# Patient Record
Sex: FEMALE | Race: WHITE | NOT HISPANIC OR LATINO | Employment: UNEMPLOYED | URBAN - METROPOLITAN AREA
[De-identification: names, ages, dates, MRNs, and addresses within clinical notes are randomized per-mention and may not be internally consistent; named-entity substitution may affect disease eponyms.]

---

## 2019-04-09 ENCOUNTER — HOSPITAL ENCOUNTER (EMERGENCY)
Facility: HOSPITAL | Age: 24
Discharge: HOME | End: 2019-04-09
Attending: EMERGENCY MEDICINE | Admitting: EMERGENCY MEDICINE

## 2019-04-09 ENCOUNTER — APPOINTMENT (OUTPATIENT)
Dept: MRI IMAGING | Facility: HOSPITAL | Age: 24
End: 2019-04-09

## 2019-04-09 VITALS
BODY MASS INDEX: 28.32 KG/M2 | RESPIRATION RATE: 16 BRPM | SYSTOLIC BLOOD PRESSURE: 134 MMHG | WEIGHT: 170 LBS | OXYGEN SATURATION: 97 % | HEART RATE: 87 BPM | HEIGHT: 65 IN | TEMPERATURE: 98 F | DIASTOLIC BLOOD PRESSURE: 70 MMHG

## 2019-04-09 DIAGNOSIS — R20.2 PARESTHESIA OF UPPER AND LOWER EXTREMITIES OF BOTH SIDES: Primary | ICD-10-CM

## 2019-04-09 DIAGNOSIS — D17.9 LIPOMA, UNSPECIFIED SITE: ICD-10-CM

## 2019-04-09 LAB
ALBUMIN SERPL-MCNC: 4.2 G/DL (ref 3.5–5.2)
ALBUMIN/GLOB SERPL: 1.2 G/DL
ALP SERPL-CCNC: 67 U/L (ref 39–117)
ALT SERPL W P-5'-P-CCNC: 11 U/L (ref 1–33)
ANION GAP SERPL CALCULATED.3IONS-SCNC: 13 MMOL/L
AST SERPL-CCNC: 32 U/L (ref 1–32)
B-HCG UR QL: NEGATIVE
BASOPHILS # BLD AUTO: 0.03 10*3/MM3 (ref 0–0.2)
BASOPHILS NFR BLD AUTO: 0.5 % (ref 0–1.5)
BILIRUB SERPL-MCNC: 0.4 MG/DL (ref 0.2–1.2)
BUN BLD-MCNC: 9 MG/DL (ref 6–20)
BUN/CREAT SERPL: 12.3 (ref 7–25)
CALCIUM SPEC-SCNC: 8.7 MG/DL (ref 8.6–10.5)
CHLORIDE SERPL-SCNC: 103 MMOL/L (ref 98–107)
CO2 SERPL-SCNC: 21 MMOL/L (ref 22–29)
CREAT BLD-MCNC: 0.73 MG/DL (ref 0.57–1)
DEPRECATED RDW RBC AUTO: 41.7 FL (ref 37–54)
EOSINOPHIL # BLD AUTO: 0.1 10*3/MM3 (ref 0–0.4)
EOSINOPHIL NFR BLD AUTO: 1.7 % (ref 0.3–6.2)
ERYTHROCYTE [DISTWIDTH] IN BLOOD BY AUTOMATED COUNT: 14.8 % (ref 12.3–15.4)
GFR SERPL CREATININE-BSD FRML MDRD: 98 ML/MIN/1.73
GLOBULIN UR ELPH-MCNC: 3.4 GM/DL
GLUCOSE BLD-MCNC: 100 MG/DL (ref 65–99)
HCT VFR BLD AUTO: 35.1 % (ref 34–46.6)
HGB BLD-MCNC: 10.4 G/DL (ref 12–15.9)
IMM GRANULOCYTES # BLD AUTO: 0.01 10*3/MM3 (ref 0–0.05)
IMM GRANULOCYTES NFR BLD AUTO: 0.2 % (ref 0–0.5)
INTERNAL NEGATIVE CONTROL: NEGATIVE
INTERNAL POSITIVE CONTROL: POSITIVE
LYMPHOCYTES # BLD AUTO: 1.8 10*3/MM3 (ref 0.7–3.1)
LYMPHOCYTES NFR BLD AUTO: 30.6 % (ref 19.6–45.3)
Lab: NORMAL
MCH RBC QN AUTO: 23.3 PG (ref 26.6–33)
MCHC RBC AUTO-ENTMCNC: 29.6 G/DL (ref 31.5–35.7)
MCV RBC AUTO: 78.5 FL (ref 79–97)
MONOCYTES # BLD AUTO: 0.62 10*3/MM3 (ref 0.1–0.9)
MONOCYTES NFR BLD AUTO: 10.5 % (ref 5–12)
NEUTROPHILS # BLD AUTO: 3.33 10*3/MM3 (ref 1.4–7)
NEUTROPHILS NFR BLD AUTO: 56.7 % (ref 42.7–76)
PLATELET # BLD AUTO: 310 10*3/MM3 (ref 140–450)
PMV BLD AUTO: 9 FL (ref 6–12)
POTASSIUM BLD-SCNC: 4.7 MMOL/L (ref 3.5–5.2)
PROT SERPL-MCNC: 7.6 G/DL (ref 6–8.5)
RBC # BLD AUTO: 4.47 10*6/MM3 (ref 3.77–5.28)
SODIUM BLD-SCNC: 137 MMOL/L (ref 136–145)
WBC NRBC COR # BLD: 5.88 10*3/MM3 (ref 3.4–10.8)

## 2019-04-09 PROCEDURE — A9577 INJ MULTIHANCE: HCPCS | Performed by: EMERGENCY MEDICINE

## 2019-04-09 PROCEDURE — 99284 EMERGENCY DEPT VISIT MOD MDM: CPT

## 2019-04-09 PROCEDURE — 85025 COMPLETE CBC W/AUTO DIFF WBC: CPT | Performed by: PHYSICIAN ASSISTANT

## 2019-04-09 PROCEDURE — 80053 COMPREHEN METABOLIC PANEL: CPT | Performed by: PHYSICIAN ASSISTANT

## 2019-04-09 PROCEDURE — 81025 URINE PREGNANCY TEST: CPT | Performed by: PHYSICIAN ASSISTANT

## 2019-04-09 PROCEDURE — 70553 MRI BRAIN STEM W/O & W/DYE: CPT

## 2019-04-09 PROCEDURE — 0 GADOBENATE DIMEGLUMINE 529 MG/ML SOLUTION: Performed by: EMERGENCY MEDICINE

## 2019-04-09 RX ADMIN — GADOBENATE DIMEGLUMINE 15 ML: 529 INJECTION, SOLUTION INTRAVENOUS at 16:33

## 2019-04-09 NOTE — ED PROVIDER NOTES
Subjective   Ms. Liliya Renteria is a 23-year-old female presenting to the emergency department with complaints of a neurologic problem. The patient reports that she was diagnosed with a pituitary tumor about one year ago in a hospital in [place]. She reports that she had multiple transient ischemic attacks, diagnosed by her OB/GYN at the time, as she was pregnant with her third child. She has continued to have TIAs since that time, and the frequency has increased significantly. Her most recent episode was four days ago. She states that the episodes are characterized by first a tingling sensation in her hand (usually the right side) into her arm, face, and sometimes to her leg. Often symptoms of tingling start on one side of her body and then move from right to left. If she does not sit down in time, she falls to the ground. She also experiencing difficulty saying things she wants to when these episodes occur. She denies accompanying dizziness, weakness, numbness, visual changes, chest pain, shortness of breath, fevers, or abdominal pain. The episodes typically last about 20-25 minutes each. She states that she has a history of smoking but quit a few weeks ago and now smokes vapors instead. Her past medical history is otherwise unremarkable. Ms. Renteria notes that she moved here to live with her sister, and she does not currently have a local neurologist or primary care provider, prompting her to instead come to the emergency department. There are no other additional acute complaints at this time.        History provided by:  Patient  Neurologic Problem   The patient's primary symptoms include clumsiness, a loss of balance, slurred speech and weakness. The patient's pertinent negatives include no focal weakness or visual change. This is a chronic problem. The current episode started more than 1 year ago. The neurological problem developed gradually. The last time the patient was known to be well was 4/9/2019 3:27 PM.   The problem has been waxing and waning since onset. Pertinent negatives include no abdominal pain, chest pain, fever or shortness of breath. Past treatments include nothing. (Pituitary tumor)       Review of Systems   Constitutional: Negative for fever.   HENT: Negative.    Eyes: Negative for visual disturbance.   Respiratory: Negative.  Negative for shortness of breath.    Cardiovascular: Negative.  Negative for chest pain.   Gastrointestinal: Negative.  Negative for abdominal pain.   Genitourinary: Negative.    Musculoskeletal: Negative.    Skin: Negative.    Neurological: Positive for speech difficulty, weakness and loss of balance. Negative for focal weakness and numbness.   Psychiatric/Behavioral: Negative.    All other systems reviewed and are negative.      History reviewed. No pertinent past medical history.    No Known Allergies    History reviewed. No pertinent surgical history.    History reviewed. No pertinent family history.    Social History     Socioeconomic History   • Marital status:      Spouse name: Not on file   • Number of children: Not on file   • Years of education: Not on file   • Highest education level: Not on file   Tobacco Use   • Smoking status: Never Smoker   • Smokeless tobacco: Never Used   Substance and Sexual Activity   • Alcohol use: No     Frequency: Never   • Drug use: No   • Sexual activity: Defer         Objective   Physical Exam   Constitutional: She is oriented to person, place, and time. She appears well-developed and well-nourished. No distress.   HENT:   Head: Normocephalic and atraumatic.   Nose: Nose normal.   Eyes: Conjunctivae are normal. No scleral icterus.   Neck: Normal range of motion. Neck supple.   Cardiovascular: Normal rate, regular rhythm and normal heart sounds.   No murmur heard.  Pulmonary/Chest: Effort normal and breath sounds normal. No respiratory distress.   Abdominal: Soft. Bowel sounds are normal. There is no tenderness.   Musculoskeletal:  "Normal range of motion.   Neurological: She is alert and oriented to person, place, and time. She has normal strength. No cranial nerve deficit or sensory deficit.   Skin: Skin is warm and dry.   Psychiatric: She has a normal mood and affect. Her behavior is normal.   Nursing note and vitals reviewed.      Procedures         ED Course      Re-examined patient several times in ED. Pt symptom free. Discussed MRI results and f/u with PCP, Neurology and Neurosurgery. She is agreeable with plan.     Reviewed old records. Obtained MRI from [facility].   MRI Brain WO contrast 3/14/18  \"subcentimeter T1 hyperintense lesion in the hypothalamic region. This may reflex small lipoma. Other etiologies could include ectopic posterior pituitary gland, calcification, or possibly subacute hemorrhage.\"    MRI Brain With Contrast 5/31/18  \"the subcentimeter T1 hyperintense lesion in the hypothalamus follows fat on all sequences and does not enhance, consistent with small lipoma.\"     Recent Results (from the past 24 hour(s))   POCT Pregnancy, Urine    Collection Time: 04/09/19  2:46 PM   Result Value Ref Range    HCG, Urine, QL Negative Negative    Lot Number [id number]     Internal Positive Control Positive     Internal Negative Control Negative    Comprehensive Metabolic Panel    Collection Time: 04/09/19  2:59 PM   Result Value Ref Range    Glucose 100 (H) 65 - 99 mg/dL    BUN 9 6 - 20 mg/dL    Creatinine 0.73 0.57 - 1.00 mg/dL    Sodium 137 136 - 145 mmol/L    Potassium 4.7 3.5 - 5.2 mmol/L    Chloride 103 98 - 107 mmol/L    CO2 21.0 (L) 22.0 - 29.0 mmol/L    Calcium 8.7 8.6 - 10.5 mg/dL    Total Protein 7.6 6.0 - 8.5 g/dL    Albumin 4.20 3.50 - 5.20 g/dL    ALT (SGPT) 11 1 - 33 U/L    AST (SGOT) 32 1 - 32 U/L    Alkaline Phosphatase 67 39 - 117 U/L    Total Bilirubin 0.4 0.2 - 1.2 mg/dL    eGFR Non African Amer 98 >60 mL/min/1.73    Globulin 3.4 gm/dL    A/G Ratio 1.2 g/dL    BUN/Creatinine Ratio 12.3 7.0 - 25.0    " "Anion Gap 13.0 mmol/L   CBC Auto Differential    Collection Time: 04/09/19  2:59 PM   Result Value Ref Range    WBC 5.88 3.40 - 10.80 10*3/mm3    RBC 4.47 3.77 - 5.28 10*6/mm3    Hemoglobin 10.4 (L) 12.0 - 15.9 g/dL    Hematocrit 35.1 34.0 - 46.6 %    MCV 78.5 (L) 79.0 - 97.0 fL    MCH 23.3 (L) 26.6 - 33.0 pg    MCHC 29.6 (L) 31.5 - 35.7 g/dL    RDW 14.8 12.3 - 15.4 %    RDW-SD 41.7 37.0 - 54.0 fl    MPV 9.0 6.0 - 12.0 fL    Platelets 310 140 - 450 10*3/mm3    Neutrophil % 56.7 42.7 - 76.0 %    Lymphocyte % 30.6 19.6 - 45.3 %    Monocyte % 10.5 5.0 - 12.0 %    Eosinophil % 1.7 0.3 - 6.2 %    Basophil % 0.5 0.0 - 1.5 %    Immature Grans % 0.2 0.0 - 0.5 %    Neutrophils, Absolute 3.33 1.40 - 7.00 10*3/mm3    Lymphocytes, Absolute 1.80 0.70 - 3.10 10*3/mm3    Monocytes, Absolute 0.62 0.10 - 0.90 10*3/mm3    Eosinophils, Absolute 0.10 0.00 - 0.40 10*3/mm3    Basophils, Absolute 0.03 0.00 - 0.20 10*3/mm3    Immature Grans, Absolute 0.01 0.00 - 0.05 10*3/mm3     Note: In addition to lab results from this visit, the labs listed above may include labs taken at another facility or during a different encounter within the last 24 hours. Please correlate lab times with ED admission and discharge times for further clarification of the services performed during this visit.    MRI Brain With & Without Contrast    (Results Pending)     Vitals:    04/09/19 1250 04/09/19 1939   BP: 126/61 134/70   BP Location: Left arm    Patient Position: Sitting    Pulse: 96 87   Resp: 18 16   Temp: 97.9 °F (36.6 °C) 98 °F (36.7 °C)   TempSrc: Oral    SpO2: 96% 97%   Weight: 77.1 kg (170 lb)    Height: 165.1 cm (65\")      Medications   gadobenate dimeglumine (MULTIHANCE) injection 15 mL (15 mL Intravenous Given 4/9/19 1633)     ECG/EMG Results (last 24 hours)     ** No results found for the last 24 hours. **        No orders to display                       MDM    Final diagnoses:   Paresthesia of upper and lower extremities of both sides   Lipoma, " unspecified site       Documentation assistance provided by jozef Almonte.  Information recorded by the jozef was done at my direction and has been verified and validated by me.     Gregorio Almonte  04/09/19 1528       Courtney Davenport PA  04/09/19 3752

## 2019-04-09 NOTE — DISCHARGE INSTRUCTIONS
Follow up with one of the [facility] Primary Care Providers below to setup primary care. If you need assistance coordinating a primary care appointment with a [facility] Primary Care Provider, please contact the Primary Care Coordinators at [phone] for appointment scheduling.    [facility], Primary Care   [address], [address]   [place], [place] [address] [phone]    [facility] End[facility]  [address], [address]  [place] [address] [phone]    [facility]  [address], [address]   [place], [place] [address] [phone]    [facility]  [address], [address]  [place], [place] [address] [phone]    [facility], Primary Care,   [address], [address]   [place], [place] [address] [phone]    [facility]  [address], [address]  [place], [place] [id number] [phone]    [facility], Primary Care  [address], [address]  [place], [place] [address] [phone]    [facility]  [address], [address]   [place] [address] [phone]    [facility], Primary Care  [address] [place] [address] [phone]      [facility]  [address], [address]   [place] [address] [phone]    [facility]  [address] [place] [address] [phone]    Follow up with one of the physician centers below to setup primary care.    [facility], [phone], [address], [address], [place], [facility] [place]    [facility], [phone], [address]  [address]    [facility], [phone], [address] [place] [address];     [facility], [phone], [address] [place] [address]

## 2019-05-20 ENCOUNTER — OFFICE VISIT (OUTPATIENT)
Dept: INTERNAL MEDICINE | Facility: CLINIC | Age: 24
End: 2019-05-20

## 2019-05-20 VITALS
SYSTOLIC BLOOD PRESSURE: 100 MMHG | OXYGEN SATURATION: 98 % | DIASTOLIC BLOOD PRESSURE: 58 MMHG | BODY MASS INDEX: 31.74 KG/M2 | RESPIRATION RATE: 18 BRPM | HEIGHT: 65 IN | HEART RATE: 76 BPM | TEMPERATURE: 99.2 F | WEIGHT: 190.5 LBS

## 2019-05-20 DIAGNOSIS — R20.0 NUMBNESS AND TINGLING: ICD-10-CM

## 2019-05-20 DIAGNOSIS — Z13.29 SCREENING FOR THYROID DISORDER: ICD-10-CM

## 2019-05-20 DIAGNOSIS — L72.9 CUTANEOUS CYST: ICD-10-CM

## 2019-05-20 DIAGNOSIS — Z13.21 ENCOUNTER FOR VITAMIN DEFICIENCY SCREENING: ICD-10-CM

## 2019-05-20 DIAGNOSIS — D64.9 ANEMIA, UNSPECIFIED TYPE: ICD-10-CM

## 2019-05-20 DIAGNOSIS — R53.83 FATIGUE, UNSPECIFIED TYPE: ICD-10-CM

## 2019-05-20 DIAGNOSIS — R20.2 NUMBNESS AND TINGLING: ICD-10-CM

## 2019-05-20 DIAGNOSIS — F41.9 ANXIETY: Primary | ICD-10-CM

## 2019-05-20 DIAGNOSIS — Z13.0 SCREENING FOR BLOOD DISEASE: ICD-10-CM

## 2019-05-20 DIAGNOSIS — G93.0 SUPRASELLAR CYST: ICD-10-CM

## 2019-05-20 DIAGNOSIS — R20.0 FACIAL NUMBNESS: ICD-10-CM

## 2019-05-20 LAB
ALBUMIN SERPL-MCNC: 4.2 G/DL (ref 3.5–5.2)
ALBUMIN/GLOB SERPL: 1.2 G/DL
ALP SERPL-CCNC: 61 U/L (ref 39–117)
ALT SERPL W P-5'-P-CCNC: 14 U/L (ref 1–33)
ANION GAP SERPL CALCULATED.3IONS-SCNC: 13.6 MMOL/L
AST SERPL-CCNC: 17 U/L (ref 1–32)
BASOPHILS # BLD AUTO: 0.03 10*3/MM3 (ref 0–0.2)
BASOPHILS NFR BLD AUTO: 0.5 % (ref 0–1.5)
BILIRUB SERPL-MCNC: 0.4 MG/DL (ref 0.2–1.2)
BUN BLD-MCNC: 7 MG/DL (ref 6–20)
BUN/CREAT SERPL: 11.1 (ref 7–25)
CALCIUM SPEC-SCNC: 9.7 MG/DL (ref 8.6–10.5)
CHLORIDE SERPL-SCNC: 103 MMOL/L (ref 98–107)
CO2 SERPL-SCNC: 23.4 MMOL/L (ref 22–29)
CREAT BLD-MCNC: 0.63 MG/DL (ref 0.57–1)
DEPRECATED RDW RBC AUTO: 46.2 FL (ref 37–54)
EOSINOPHIL # BLD AUTO: 0.04 10*3/MM3 (ref 0–0.4)
EOSINOPHIL NFR BLD AUTO: 0.7 % (ref 0.3–6.2)
ERYTHROCYTE [DISTWIDTH] IN BLOOD BY AUTOMATED COUNT: 15.9 % (ref 12.3–15.4)
FOLATE SERPL-MCNC: 3.52 NG/ML (ref 4.78–24.2)
GFR SERPL CREATININE-BSD FRML MDRD: 116 ML/MIN/1.73
GLOBULIN UR ELPH-MCNC: 3.6 GM/DL
GLUCOSE BLD-MCNC: 81 MG/DL (ref 65–99)
HCT VFR BLD AUTO: 37.4 % (ref 34–46.6)
HGB BLD-MCNC: 10.8 G/DL (ref 12–15.9)
IMM GRANULOCYTES # BLD AUTO: 0.02 10*3/MM3 (ref 0–0.05)
IMM GRANULOCYTES NFR BLD AUTO: 0.3 % (ref 0–0.5)
LYMPHOCYTES # BLD AUTO: 1.48 10*3/MM3 (ref 0.7–3.1)
LYMPHOCYTES NFR BLD AUTO: 24.6 % (ref 19.6–45.3)
MCH RBC QN AUTO: 23.3 PG (ref 26.6–33)
MCHC RBC AUTO-ENTMCNC: 28.9 G/DL (ref 31.5–35.7)
MCV RBC AUTO: 80.6 FL (ref 79–97)
MONOCYTES # BLD AUTO: 0.58 10*3/MM3 (ref 0.1–0.9)
MONOCYTES NFR BLD AUTO: 9.6 % (ref 5–12)
NEUTROPHILS # BLD AUTO: 3.87 10*3/MM3 (ref 1.7–7)
NEUTROPHILS NFR BLD AUTO: 64.3 % (ref 42.7–76)
NRBC BLD AUTO-RTO: 0 /100 WBC (ref 0–0.2)
PLATELET # BLD AUTO: 345 10*3/MM3 (ref 140–450)
PMV BLD AUTO: 9.8 FL (ref 6–12)
POTASSIUM BLD-SCNC: 4.4 MMOL/L (ref 3.5–5.2)
PROT SERPL-MCNC: 7.8 G/DL (ref 6–8.5)
RBC # BLD AUTO: 4.64 10*6/MM3 (ref 3.77–5.28)
SODIUM BLD-SCNC: 140 MMOL/L (ref 136–145)
TSH SERPL DL<=0.05 MIU/L-ACNC: 1.16 MIU/ML (ref 0.27–4.2)
VIT B12 BLD-MCNC: 335 PG/ML (ref 211–946)
WBC NRBC COR # BLD: 6.02 10*3/MM3 (ref 3.4–10.8)

## 2019-05-20 PROCEDURE — 84466 ASSAY OF TRANSFERRIN: CPT | Performed by: NURSE PRACTITIONER

## 2019-05-20 PROCEDURE — 82652 VIT D 1 25-DIHYDROXY: CPT | Performed by: NURSE PRACTITIONER

## 2019-05-20 PROCEDURE — 83540 ASSAY OF IRON: CPT | Performed by: NURSE PRACTITIONER

## 2019-05-20 PROCEDURE — 82607 VITAMIN B-12: CPT | Performed by: NURSE PRACTITIONER

## 2019-05-20 PROCEDURE — 80050 GENERAL HEALTH PANEL: CPT | Performed by: NURSE PRACTITIONER

## 2019-05-20 PROCEDURE — 82746 ASSAY OF FOLIC ACID SERUM: CPT | Performed by: NURSE PRACTITIONER

## 2019-05-20 RX ORDER — HYDROXYZINE HYDROCHLORIDE 25 MG/1
TABLET, FILM COATED ORAL
Qty: 90 TABLET | Refills: 1 | Status: SHIPPED | OUTPATIENT
Start: 2019-05-20 | End: 2020-10-08
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
## 2019-05-21 NOTE — PROGRESS NOTES
I have reviewed the notes, assessments, and/or procedures performed by KANE Cid and I concur with her documentation of Liliya Renteria.

## 2019-05-22 DIAGNOSIS — R90.89 ABNORMAL FINDING ON MRI OF BRAIN: Primary | ICD-10-CM

## 2019-05-22 DIAGNOSIS — R20.2 NUMBNESS AND TINGLING: ICD-10-CM

## 2019-05-22 DIAGNOSIS — E53.9 VITAMIN B DEFICIENCY: ICD-10-CM

## 2019-05-22 DIAGNOSIS — D64.9 ANEMIA, UNSPECIFIED TYPE: ICD-10-CM

## 2019-05-22 DIAGNOSIS — R20.0 NUMBNESS AND TINGLING: ICD-10-CM

## 2019-05-22 DIAGNOSIS — E53.8 FOLIC ACID DEFICIENCY: Primary | ICD-10-CM

## 2019-05-22 LAB
IRON 24H UR-MRATE: 33 MCG/DL (ref 37–145)
IRON SATN MFR SERPL: 7 % (ref 20–50)
TIBC SERPL-MCNC: 504 MCG/DL (ref 298–536)
TRANSFERRIN SERPL-MCNC: 338 MG/DL (ref 200–360)

## 2019-05-22 RX ORDER — LANOLIN ALCOHOL/MO/W.PET/CERES
400 CREAM (GRAM) TOPICAL DAILY
Qty: 30 TABLET | Refills: 11 | Status: SHIPPED | OUTPATIENT
Start: 2019-05-22 | End: 2019-10-02

## 2019-05-22 RX ORDER — MULTIVIT/IRON SULF/FOLIC ACID 15MG-0.4MG
1 TABLET ORAL DAILY
Qty: 30 TABLET | Refills: 11 | Status: SHIPPED | OUTPATIENT
Start: 2019-05-22 | End: 2019-10-02

## 2019-05-23 LAB — 1,25(OH)2D3 SERPL-MCNC: 51.5 PG/ML (ref 19.9–79.3)

## 2019-10-02 ENCOUNTER — OFFICE VISIT (OUTPATIENT)
Dept: INTERNAL MEDICINE | Facility: CLINIC | Age: 24
End: 2019-10-02

## 2019-10-02 VITALS
SYSTOLIC BLOOD PRESSURE: 110 MMHG | TEMPERATURE: 96.6 F | DIASTOLIC BLOOD PRESSURE: 62 MMHG | HEART RATE: 84 BPM | RESPIRATION RATE: 18 BRPM | HEIGHT: 65 IN | BODY MASS INDEX: 31.51 KG/M2 | WEIGHT: 189.13 LBS | OXYGEN SATURATION: 100 %

## 2019-10-02 DIAGNOSIS — F41.9 ANXIETY: ICD-10-CM

## 2019-10-02 DIAGNOSIS — F33.1 MODERATE EPISODE OF RECURRENT MAJOR DEPRESSIVE DISORDER (HCC): Primary | ICD-10-CM

## 2019-10-02 DIAGNOSIS — M25.551 RIGHT HIP PAIN: ICD-10-CM

## 2019-10-02 PROCEDURE — 99214 OFFICE O/P EST MOD 30 MIN: CPT | Performed by: NURSE PRACTITIONER

## 2019-10-02 PROCEDURE — 96372 THER/PROPH/DIAG INJ SC/IM: CPT | Performed by: NURSE PRACTITIONER

## 2019-10-02 RX ORDER — NAPROXEN 500 MG/1
500 TABLET ORAL 2 TIMES DAILY WITH MEALS
Qty: 60 TABLET | Refills: 5 | Status: SHIPPED | OUTPATIENT
Start: 2019-10-02 | End: 2019-11-06

## 2019-10-02 RX ORDER — KETOROLAC TROMETHAMINE 30 MG/ML
60 INJECTION, SOLUTION INTRAMUSCULAR; INTRAVENOUS ONCE
Status: COMPLETED | OUTPATIENT
Start: 2019-10-02 | End: 2019-10-02

## 2019-10-02 RX ORDER — PREDNISONE 1 MG/1
TABLET ORAL
Qty: 21 TABLET | Refills: 0 | Status: SHIPPED | OUTPATIENT
Start: 2019-10-02 | End: 2019-11-06

## 2019-10-02 RX ORDER — BUSPIRONE HYDROCHLORIDE 10 MG/1
10 TABLET ORAL 3 TIMES DAILY PRN
Qty: 60 TABLET | Refills: 1 | Status: SHIPPED | OUTPATIENT
Start: 2019-10-02 | End: 2020-10-08

## 2019-10-02 RX ORDER — TRIAMCINOLONE ACETONIDE 40 MG/ML
40 INJECTION, SUSPENSION INTRA-ARTICULAR; INTRAMUSCULAR ONCE
Status: COMPLETED | OUTPATIENT
Start: 2019-10-02 | End: 2019-10-02

## 2019-10-02 RX ADMIN — TRIAMCINOLONE ACETONIDE 40 MG: 40 INJECTION, SUSPENSION INTRA-ARTICULAR; INTRAMUSCULAR at 11:44

## 2019-10-02 RX ADMIN — KETOROLAC TROMETHAMINE 60 MG: 30 INJECTION, SOLUTION INTRAMUSCULAR; INTRAVENOUS at 11:44

## 2019-10-02 NOTE — PROGRESS NOTES
Subjective   Chief Complaint   Patient presents with   • Hip Pain     x2 days   • Depression      Liliya Renteria is a 24 y.o. female here today for right hip pain and depression.    I have reviewed the following portions of the patient's history and confirmed they are accurate: allergies, current medications, past family history, past medical history, past social history, past surgical history, problem list and ROS     I have personally completed the patient's review of systems.    Review of Systems   Constitutional: Positive for fatigue. Negative for activity change, appetite change, chills, diaphoresis, fever, unexpected weight gain and unexpected weight loss.   HENT: Negative for ear discharge, ear pain, mouth sores, nosebleeds, sinus pressure, sneezing and sore throat.    Eyes: Negative for pain, discharge and itching.   Respiratory: Negative for cough, chest tightness, shortness of breath and wheezing.    Cardiovascular: Negative for chest pain, palpitations and leg swelling.   Gastrointestinal: Negative for abdominal pain, constipation, diarrhea, nausea and vomiting.   Endocrine: Negative for heat intolerance, polydipsia and polyphagia.   Genitourinary: Negative for dysuria, flank pain, frequency, hematuria and urgency.   Musculoskeletal: Positive for arthralgias. Negative for back pain, gait problem, joint swelling, myalgias, neck pain and neck stiffness.   Skin: Negative for color change, pallor and rash.   Allergic/Immunologic: Negative for immunocompromised state.   Neurological: Positive for weakness and numbness. Negative for seizures and speech difficulty.   Hematological: Negative for adenopathy.   Psychiatric/Behavioral: Positive for dysphoric mood and depressed mood. Negative for agitation, decreased concentration and sleep disturbance. The patient is nervous/anxious.        Current Outpatient Medications on File Prior to Visit   Medication Sig   • hydrOXYzine (ATARAX) 25 MG tablet Take 1-2 tablets up  "to 3 times daily as needed for anxiety.     No current facility-administered medications on file prior to visit.        Objective   Vitals:    10/02/19 1104   BP: 110/62   BP Location: Left arm   Patient Position: Sitting   Cuff Size: Adult   Pulse: 84   Resp: 18   Temp: 96.6 °F (35.9 °C)   TempSrc: Temporal   SpO2: 100%   Weight: 85.8 kg (189 lb 2 oz)   Height: 165.1 cm (65\")   PainSc:   8   PainLoc: Hip     Body mass index is 31.47 kg/m².    Physical Exam   Constitutional: She is oriented to person, place, and time. She appears well-developed and well-nourished.   HENT:   Head: Normocephalic and atraumatic.   Nose: Nose normal.   Eyes: EOM are normal. Pupils are equal, round, and reactive to light.   Neck: Trachea normal and normal range of motion. No thyromegaly present.   Cardiovascular: Normal rate, regular rhythm and normal heart sounds.   Pulmonary/Chest: Effort normal and breath sounds normal.   Abdominal: Soft. Bowel sounds are normal. There is no tenderness.   Musculoskeletal: Normal range of motion.        Right hip: She exhibits tenderness.   Neurological: She is alert and oriented to person, place, and time. She has normal strength. No cranial nerve deficit or sensory deficit. GCS eye subscore is 4. GCS verbal subscore is 5. GCS motor subscore is 6.   Skin: Skin is warm and dry.   Psychiatric: Her speech is normal and behavior is normal. Thought content normal. Her mood appears anxious. Cognition and memory are normal. She exhibits a depressed mood.   Vitals reviewed.      Assessment/Plan   Liliya was seen today for hip pain and depression.    Diagnoses and all orders for this visit:    Moderate episode of recurrent major depressive disorder (CMS/HCC)  Chronic issue unstable requiring medication management and monitoring. Will eat well balanced diet, increase water intake, increase physical activity during the day, and get adequate rest. Discussed relaxation and coping skills and exercises. Suggested " self referral to behavioral therapist.  Start Zoloft.  -     sertraline (ZOLOFT) 50 MG tablet; Take 1 tablet by mouth Daily.    Anxiety  Chronic issue unstable requiring medication management and monitoring. Will eat well balanced diet, increase water intake, increase physical activity during the day, and get adequate rest. Discussed relaxation and coping skills and exercises. Suggested self referral to behavioral therapist.  Start Zoloft and BuSpar.  Continue hydroxyzine as needed.  -     sertraline (ZOLOFT) 50 MG tablet; Take 1 tablet by mouth Daily.  -     busPIRone (BUSPAR) 10 MG tablet; Take 1 tablet by mouth 3 (Three) Times a Day As Needed (anxiety).    Right hip pain  New onset issue requiring further work-up and medication management.  She was given Toradol and Kenalog injections today.  She will start naproxen and steroid pack tomorrow.  She will apply ice or heating pad to her hip as needed for discomfort.  -     ketorolac (TORADOL) injection 60 mg  -     triamcinolone acetonide (KENALOG-40) injection 40 mg  -     predniSONE (DELTASONE) 5 MG tablet; Taper pack, take as directed.  -     naproxen (EC NAPROSYN) 500 MG EC tablet; Take 1 tablet by mouth 2 (Two) Times a Day With Meals.  -     XR Hip With or Without Pelvis 2 - 3 View Right; Future           Current Outpatient Medications:   •  hydrOXYzine (ATARAX) 25 MG tablet, Take 1-2 tablets up to 3 times daily as needed for anxiety., Disp: 90 tablet, Rfl: 1  •  busPIRone (BUSPAR) 10 MG tablet, Take 1 tablet by mouth 3 (Three) Times a Day As Needed (anxiety)., Disp: 60 tablet, Rfl: 1  •  naproxen (EC NAPROSYN) 500 MG EC tablet, Take 1 tablet by mouth 2 (Two) Times a Day With Meals., Disp: 60 tablet, Rfl: 5  •  predniSONE (DELTASONE) 5 MG tablet, Taper pack, take as directed., Disp: 21 tablet, Rfl: 0  •  sertraline (ZOLOFT) 50 MG tablet, Take 1 tablet by mouth Daily., Disp: 30 tablet, Rfl: 1       Plan of care reviewed with the patient at the conclusion of  today's visit.  Education was provided regarding diagnosis, management, and any prescribed or recommended OTC medications.  Patient verbalized understanding of and agreement with management plan.     Return in about 1 month (around 11/2/2019), or if symptoms worsen or fail to improve.      Karlie Alexandre, APRN

## 2019-11-01 ENCOUNTER — TELEPHONE (OUTPATIENT)
Dept: INTERNAL MEDICINE | Facility: CLINIC | Age: 24
End: 2019-11-01

## 2019-11-01 DIAGNOSIS — F41.9 ANXIETY: ICD-10-CM

## 2019-11-01 DIAGNOSIS — F33.1 MODERATE EPISODE OF RECURRENT MAJOR DEPRESSIVE DISORDER (HCC): ICD-10-CM

## 2019-12-23 ENCOUNTER — TELEPHONE (OUTPATIENT)
Dept: INTERNAL MEDICINE | Facility: CLINIC | Age: 24
End: 2019-12-23

## 2019-12-23 DIAGNOSIS — F41.9 ANXIETY: ICD-10-CM

## 2019-12-23 DIAGNOSIS — F33.1 MODERATE EPISODE OF RECURRENT MAJOR DEPRESSIVE DISORDER (HCC): ICD-10-CM

## 2020-10-08 ENCOUNTER — OFFICE VISIT (OUTPATIENT)
Dept: INTERNAL MEDICINE | Facility: CLINIC | Age: 25
End: 2020-10-08

## 2020-10-08 VITALS
BODY MASS INDEX: 34.66 KG/M2 | HEART RATE: 70 BPM | HEIGHT: 65 IN | OXYGEN SATURATION: 98 % | RESPIRATION RATE: 16 BRPM | WEIGHT: 208 LBS | DIASTOLIC BLOOD PRESSURE: 84 MMHG | TEMPERATURE: 97.2 F | SYSTOLIC BLOOD PRESSURE: 132 MMHG

## 2020-10-08 DIAGNOSIS — F33.1 MODERATE EPISODE OF RECURRENT MAJOR DEPRESSIVE DISORDER (HCC): Primary | ICD-10-CM

## 2020-10-08 DIAGNOSIS — E66.9 CLASS 1 OBESITY WITH SERIOUS COMORBIDITY AND BODY MASS INDEX (BMI) OF 34.0 TO 34.9 IN ADULT, UNSPECIFIED OBESITY TYPE: ICD-10-CM

## 2020-10-08 DIAGNOSIS — F41.9 ANXIETY: ICD-10-CM

## 2020-10-08 PROCEDURE — 99214 OFFICE O/P EST MOD 30 MIN: CPT | Performed by: NURSE PRACTITIONER

## 2020-10-08 RX ORDER — BUPROPION HYDROCHLORIDE 150 MG/1
150 TABLET ORAL DAILY
Qty: 30 TABLET | Refills: 1 | Status: SHIPPED | OUTPATIENT
Start: 2020-10-08 | End: 2020-11-06

## 2020-10-08 NOTE — PROGRESS NOTES
"Subjective   Chief Complaint   Patient presents with   • Anxiety     \"I need something, I don't know if it's bipolar, anger or what\"   • Depression     \"I quit taking the zoloft because it was causing weight gain\"      Liliya Renteria is a 25 y.o. female here today for depression, anxiety, and weight gain.  Patient was previously prescribed Zoloft for anxiety and depression.  This helped her symptoms but she has had weight gain.  She stopped the medication but has not been successful in losing the weight.  She is having episodes of sadness and difficulty with motivation and getting up in the morning.  She has 3 children that she is trying to homeschool and this is been very stressful.  She is having some anxiety but mostly agitation and irritability.  She states she feels angry and aggravated most of the time.  She reports eating a healthy diet but has not been physically active.  No thoughts of self-harm or harming others.  No shortness of breath or chest pain.    I have reviewed the following portions of the patient's history and confirmed they are accurate: allergies, current medications, past family history, past medical history, past social history, past surgical history and problem list    I have personally completed the patient's review of systems.    Review of Systems   Constitutional: Positive for fatigue and unexpected weight gain. Negative for activity change, appetite change, chills, diaphoresis, fever and unexpected weight loss.   HENT: Negative for ear discharge, ear pain, mouth sores, nosebleeds, sinus pressure, sneezing and sore throat.    Eyes: Negative for pain, discharge and itching.   Respiratory: Negative for cough, chest tightness, shortness of breath and wheezing.    Cardiovascular: Negative for chest pain, palpitations and leg swelling.   Gastrointestinal: Negative for abdominal pain, constipation, diarrhea, nausea and vomiting.   Endocrine: Negative for heat intolerance, polydipsia and " "polyphagia.   Genitourinary: Negative for dysuria, flank pain, frequency, hematuria and urgency.   Musculoskeletal: Positive for arthralgias. Negative for back pain, gait problem, joint swelling, myalgias, neck pain and neck stiffness.   Skin: Negative for color change, pallor and rash.   Allergic/Immunologic: Negative for immunocompromised state.   Neurological: Negative for seizures, speech difficulty, weakness and numbness.   Hematological: Negative for adenopathy.   Psychiatric/Behavioral: Positive for agitation, decreased concentration, dysphoric mood, depressed mood and stress. Negative for sleep disturbance. The patient is nervous/anxious.        Current Outpatient Medications on File Prior to Visit   Medication Sig   • [DISCONTINUED] busPIRone (BUSPAR) 10 MG tablet Take 1 tablet by mouth 3 (Three) Times a Day As Needed (anxiety).   • [DISCONTINUED] hydrOXYzine (ATARAX) 25 MG tablet Take 1-2 tablets up to 3 times daily as needed for anxiety.   • [DISCONTINUED] sertraline (ZOLOFT) 50 MG tablet Take 1 tablet by mouth Daily.     No current facility-administered medications on file prior to visit.        Objective   Vitals:    10/08/20 0900   BP: 132/84   Pulse: 70   Resp: 16   Temp: 97.2 °F (36.2 °C)   TempSrc: Temporal   SpO2: 98%   Weight: 94.3 kg (208 lb)   Height: 165.1 cm (65\")   PainSc: 0-No pain     Body mass index is 34.61 kg/m².    Physical Exam  Vitals signs reviewed.   Constitutional:       Appearance: Normal appearance. She is well-developed.   HENT:      Head: Normocephalic and atraumatic.      Nose: Nose normal.   Eyes:      General: Lids are normal.      Conjunctiva/sclera: Conjunctivae normal.      Pupils: Pupils are equal, round, and reactive to light.   Neck:      Thyroid: No thyromegaly.      Trachea: Trachea normal.   Pulmonary:      Effort: Pulmonary effort is normal. No respiratory distress.   Skin:     General: Skin is warm and dry.   Neurological:      Mental Status: She is alert and " oriented to person, place, and time.      GCS: GCS eye subscore is 4. GCS verbal subscore is 5. GCS motor subscore is 6.   Psychiatric:         Attention and Perception: Attention normal.         Mood and Affect: Mood is anxious and depressed.         Speech: Speech normal.         Behavior: Behavior normal. Behavior is cooperative.         Assessment/Plan   Problem List Items Addressed This Visit        Digestive    Class 1 obesity with serious comorbidity and body mass index (BMI) of 34.0 to 34.9 in adult    Overview     Chronic issue unstable requiring monitoring with lifestyle and diet changes.  Discussed weight loss and BMI plan with patient. Discussed and educated on Mediterranean diet.  Will follow a heart healthy diet low in cholesterol and fat.  Will focus on eating less refined carbohydrates and sugars and replace this with complex carbs.  Will eat smaller portion sizes and increase physical activity as tolerated.  Will drink adequate water and get adequate sleep. Will start multi-vit.   Start wellbutrin.          Relevant Medications    buPROPion XL (Wellbutrin XL) 150 MG 24 hr tablet       Other    Anxiety    Overview     Chronic issue unstable requiring medication management and monitoring. Will eat well balanced diet, increase water intake, increase physical activity during the day, and get adequate rest. Discussed relaxation and coping skills and exercises.   Start wellbutrin.            Relevant Medications    buPROPion XL (Wellbutrin XL) 150 MG 24 hr tablet    Moderate episode of recurrent major depressive disorder (CMS/HCC) - Primary    Overview     Chronic issue unstable requiring medication management and monitoring. Will eat well balanced diet, increase water intake, increase physical activity during the day, and get adequate rest. Discussed relaxation and coping skills and exercises.   Start wellbutrin. [employer] testing completed.          Relevant Medications    buPROPion XL (Wellbutrin XL)  150 MG 24 hr tablet             Current Outpatient Medications:   •  buPROPion XL (Wellbutrin XL) 150 MG 24 hr tablet, Take 1 tablet by mouth Daily., Disp: 30 tablet, Rfl: 1       Plan of care reviewed with the patient at the conclusion of today's visit.  Education was provided regarding diagnosis, management, and any prescribed or recommended OTC medications.  Patient verbalized understanding of and agreement with management plan.     Return in about 1 month (around 11/8/2020), or if symptoms worsen or fail to improve.      Karlie Alexandre, KANE    Please note that portions of this note were completed with a voice recognition program. Efforts were made to edit the dictations, but occasionally words are mistranscribed.

## 2020-11-06 ENCOUNTER — OFFICE VISIT (OUTPATIENT)
Dept: INTERNAL MEDICINE | Facility: CLINIC | Age: 25
End: 2020-11-06

## 2020-11-06 VITALS
TEMPERATURE: 96.8 F | BODY MASS INDEX: 34.66 KG/M2 | HEIGHT: 65 IN | OXYGEN SATURATION: 98 % | SYSTOLIC BLOOD PRESSURE: 124 MMHG | WEIGHT: 208 LBS | RESPIRATION RATE: 16 BRPM | DIASTOLIC BLOOD PRESSURE: 78 MMHG | HEART RATE: 97 BPM

## 2020-11-06 DIAGNOSIS — F33.1 MODERATE EPISODE OF RECURRENT MAJOR DEPRESSIVE DISORDER (HCC): Primary | ICD-10-CM

## 2020-11-06 DIAGNOSIS — F41.9 ANXIETY: ICD-10-CM

## 2020-11-06 DIAGNOSIS — E66.9 CLASS 1 OBESITY WITH SERIOUS COMORBIDITY AND BODY MASS INDEX (BMI) OF 34.0 TO 34.9 IN ADULT, UNSPECIFIED OBESITY TYPE: ICD-10-CM

## 2020-11-06 PROCEDURE — 99214 OFFICE O/P EST MOD 30 MIN: CPT | Performed by: NURSE PRACTITIONER

## 2020-11-06 RX ORDER — BUPROPION HYDROCHLORIDE 300 MG/1
300 TABLET ORAL DAILY
Qty: 30 TABLET | Refills: 2 | Status: SHIPPED | OUTPATIENT
Start: 2020-11-06 | End: 2021-01-19 | Stop reason: SDUPTHER

## 2020-11-06 NOTE — PROGRESS NOTES
Subjective   Chief Complaint   Patient presents with   • Depression     f/u      Liliya Renteria is a 25 y.o. female here today for obesity, depression, and anxiety.  Anxiety and depression initially improved when starting Wellbutrin but the past week she feels that symptoms have worsened.  She was progressively gaining weight and since starting Wellbutrin she has had no weight gain.  She is having difficulty losing weight but has not gained.  She likes Wellbutrin and denies any side effects.  She would like to increase the dose.  No thoughts of self-harm or harming others.  No shortness of breath or chest pain.    I have reviewed the following portions of the patient's history and confirmed they are accurate: allergies, current medications, past family history, past medical history, past social history, past surgical history and problem list    I have personally completed the patient's review of systems.    Review of Systems   Constitutional: Positive for fatigue. Negative for activity change, appetite change, chills, diaphoresis, fever, unexpected weight gain and unexpected weight loss.   HENT: Negative for ear discharge, ear pain, mouth sores, nosebleeds, sinus pressure, sneezing and sore throat.    Eyes: Negative for pain, discharge and itching.   Respiratory: Negative for cough, chest tightness, shortness of breath and wheezing.    Cardiovascular: Negative for chest pain, palpitations and leg swelling.   Gastrointestinal: Negative for abdominal pain, constipation, diarrhea, nausea and vomiting.   Endocrine: Negative for heat intolerance, polydipsia and polyphagia.   Genitourinary: Negative for dysuria, flank pain, frequency, hematuria and urgency.   Musculoskeletal: Positive for arthralgias. Negative for back pain, gait problem, joint swelling, myalgias, neck pain and neck stiffness.   Skin: Negative for color change, pallor and rash.   Allergic/Immunologic: Negative for immunocompromised state.   Neurological:  "Negative for seizures, speech difficulty, weakness and numbness.   Hematological: Negative for adenopathy.   Psychiatric/Behavioral: Positive for depressed mood and stress. Negative for agitation, decreased concentration, dysphoric mood and sleep disturbance. The patient is nervous/anxious.        No current outpatient medications on file prior to visit.     No current facility-administered medications on file prior to visit.        Objective   Vitals:    11/06/20 0949   BP: 124/78   Pulse: 97   Resp: 16   Temp: 96.8 °F (36 °C)   TempSrc: Temporal   SpO2: 98%   Weight: 94.3 kg (208 lb)   Height: 165.1 cm (65\")   PainSc: 0-No pain     Body mass index is 34.61 kg/m².    Physical Exam  Vitals signs reviewed.   Constitutional:       Appearance: Normal appearance. She is well-developed.   HENT:      Head: Normocephalic and atraumatic.      Nose: Nose normal.   Eyes:      General: Lids are normal.      Conjunctiva/sclera: Conjunctivae normal.      Pupils: Pupils are equal, round, and reactive to light.   Neck:      Thyroid: No thyromegaly.      Trachea: Trachea normal.   Pulmonary:      Effort: Pulmonary effort is normal. No respiratory distress.   Skin:     General: Skin is warm and dry.   Neurological:      Mental Status: She is alert and oriented to person, place, and time.      GCS: GCS eye subscore is 4. GCS verbal subscore is 5. GCS motor subscore is 6.   Psychiatric:         Attention and Perception: Attention normal.         Mood and Affect: Mood is anxious and depressed.         Speech: Speech normal.         Behavior: Behavior normal. Behavior is cooperative.         Assessment/Plan   Problem List Items Addressed This Visit        Digestive    Class 1 obesity with serious comorbidity and body mass index (BMI) of 34.0 to 34.9 in adult    Overview     Chronic issue unstable requiring monitoring with lifestyle and diet changes.  Discussed weight loss and BMI plan with patient. Discussed and educated on " Mediterranean diet.  Will follow a heart healthy diet low in cholesterol and fat.  Will focus on eating less refined carbohydrates and sugars and replace this with complex carbs.  Will eat smaller portion sizes and increase physical activity as tolerated.  Will drink adequate water and get adequate sleep. Will start multi-vit.   Increase wellbutrin.             Other    Anxiety    Overview     Chronic issue unstable requiring medication management and monitoring. Will eat well balanced diet, increase water intake, increase physical activity during the day, and get adequate rest. Discussed relaxation and coping skills and exercises.   Increase wellbutrin.          Moderate episode of recurrent major depressive disorder (CMS/HCC) - Primary    Overview     Chronic issue unstable requiring medication management and monitoring. Will eat well balanced diet, increase water intake, increase physical activity during the day, and get adequate rest. Discussed relaxation and coping skills and exercises.   Increase wellbutrin.          Relevant Medications    buPROPion XL (Wellbutrin XL) 300 MG 24 hr tablet             Current Outpatient Medications:   •  buPROPion XL (Wellbutrin XL) 300 MG 24 hr tablet, Take 1 tablet by mouth Daily., Disp: 30 tablet, Rfl: 2       Plan of care reviewed with the patient at the conclusion of today's visit.  Education was provided regarding diagnosis, management, and any prescribed or recommended OTC medications.  Patient verbalized understanding of and agreement with management plan.     Return in about 2 weeks (around 11/20/2020), or if symptoms worsen or fail to improve, for schedule 2 week telephone follow up. .      KANE Wu    Please note that portions of this note were completed with a voice recognition program. Efforts were made to edit the dictations, but occasionally words are mistranscribed.

## 2020-11-20 ENCOUNTER — OFFICE VISIT (OUTPATIENT)
Dept: INTERNAL MEDICINE | Facility: CLINIC | Age: 25
End: 2020-11-20

## 2020-11-20 DIAGNOSIS — E66.9 CLASS 1 OBESITY WITH SERIOUS COMORBIDITY AND BODY MASS INDEX (BMI) OF 34.0 TO 34.9 IN ADULT, UNSPECIFIED OBESITY TYPE: ICD-10-CM

## 2020-11-20 DIAGNOSIS — F41.9 ANXIETY: ICD-10-CM

## 2020-11-20 DIAGNOSIS — F33.1 MODERATE EPISODE OF RECURRENT MAJOR DEPRESSIVE DISORDER (HCC): Primary | ICD-10-CM

## 2020-11-20 PROCEDURE — 99214 OFFICE O/P EST MOD 30 MIN: CPT | Performed by: NURSE PRACTITIONER

## 2020-12-08 ENCOUNTER — TELEMEDICINE (OUTPATIENT)
Dept: INTERNAL MEDICINE | Facility: CLINIC | Age: 25
End: 2020-12-08

## 2020-12-08 DIAGNOSIS — M25.532 PAIN IN BOTH WRISTS: ICD-10-CM

## 2020-12-08 DIAGNOSIS — M25.40 JOINT SWELLING: ICD-10-CM

## 2020-12-08 DIAGNOSIS — M25.531 PAIN IN BOTH WRISTS: ICD-10-CM

## 2020-12-08 DIAGNOSIS — M79.642 PAIN IN BOTH HANDS: Primary | ICD-10-CM

## 2020-12-08 DIAGNOSIS — M79.641 PAIN IN BOTH HANDS: Primary | ICD-10-CM

## 2020-12-08 PROCEDURE — 99214 OFFICE O/P EST MOD 30 MIN: CPT | Performed by: NURSE PRACTITIONER

## 2020-12-08 RX ORDER — MELOXICAM 15 MG/1
TABLET ORAL
Qty: 30 TABLET | Refills: 5 | Status: SHIPPED | OUTPATIENT
Start: 2020-12-08 | End: 2021-06-29

## 2020-12-16 DIAGNOSIS — F41.9 ANXIETY: ICD-10-CM

## 2020-12-16 DIAGNOSIS — F33.1 MODERATE EPISODE OF RECURRENT MAJOR DEPRESSIVE DISORDER (HCC): ICD-10-CM

## 2020-12-16 DIAGNOSIS — E66.9 CLASS 1 OBESITY WITH SERIOUS COMORBIDITY AND BODY MASS INDEX (BMI) OF 34.0 TO 34.9 IN ADULT, UNSPECIFIED OBESITY TYPE: ICD-10-CM

## 2020-12-17 RX ORDER — BUPROPION HYDROCHLORIDE 150 MG/1
150 TABLET ORAL DAILY
Qty: 30 TABLET | Refills: 1 | OUTPATIENT
Start: 2020-12-17

## 2021-01-02 PROBLEM — M79.642 PAIN IN BOTH HANDS: Status: ACTIVE | Noted: 2021-01-02

## 2021-01-02 PROBLEM — M79.641 PAIN IN BOTH HANDS: Status: ACTIVE | Noted: 2021-01-02

## 2021-01-19 RX ORDER — BUPROPION HYDROCHLORIDE 300 MG/1
300 TABLET ORAL DAILY
Qty: 30 TABLET | Refills: 2 | Status: SHIPPED | OUTPATIENT
Start: 2021-01-19 | End: 2021-06-29

## 2021-02-28 DIAGNOSIS — F33.1 MODERATE EPISODE OF RECURRENT MAJOR DEPRESSIVE DISORDER (HCC): ICD-10-CM

## 2021-02-28 DIAGNOSIS — F41.9 ANXIETY: ICD-10-CM

## 2021-02-28 DIAGNOSIS — E66.9 CLASS 1 OBESITY WITH SERIOUS COMORBIDITY AND BODY MASS INDEX (BMI) OF 34.0 TO 34.9 IN ADULT, UNSPECIFIED OBESITY TYPE: ICD-10-CM

## 2021-03-01 RX ORDER — BUPROPION HYDROCHLORIDE 150 MG/1
150 TABLET ORAL DAILY
Qty: 30 TABLET | Refills: 1 | OUTPATIENT
Start: 2021-03-01

## 2021-05-21 ENCOUNTER — HOSPITAL ENCOUNTER (EMERGENCY)
Facility: HOSPITAL | Age: 26
Discharge: HOME | End: 2021-05-21
Attending: EMERGENCY MEDICINE | Admitting: EMERGENCY MEDICINE

## 2021-05-21 ENCOUNTER — APPOINTMENT (OUTPATIENT)
Dept: MRI IMAGING | Facility: HOSPITAL | Age: 26
End: 2021-05-21

## 2021-05-21 VITALS
TEMPERATURE: 98.1 F | OXYGEN SATURATION: 98 % | SYSTOLIC BLOOD PRESSURE: 105 MMHG | HEART RATE: 68 BPM | DIASTOLIC BLOOD PRESSURE: 46 MMHG | WEIGHT: 180 LBS | HEIGHT: 65 IN | BODY MASS INDEX: 29.99 KG/M2 | RESPIRATION RATE: 19 BRPM

## 2021-05-21 DIAGNOSIS — D17.0 LIPOMA OF HEAD: Primary | ICD-10-CM

## 2021-05-21 DIAGNOSIS — R20.2 PARESTHESIA OF RIGHT UPPER EXTREMITY: ICD-10-CM

## 2021-05-21 DIAGNOSIS — R20.2 TINGLING OF LEFT UPPER EXTREMITY: ICD-10-CM

## 2021-05-21 LAB
ALBUMIN SERPL-MCNC: 4 G/DL (ref 3.5–5.2)
ALBUMIN/GLOB SERPL: 1.5 G/DL
ALP SERPL-CCNC: 59 U/L (ref 39–117)
ALT SERPL W P-5'-P-CCNC: 13 U/L (ref 1–33)
ANION GAP SERPL CALCULATED.3IONS-SCNC: 8 MMOL/L (ref 5–15)
AST SERPL-CCNC: 14 U/L (ref 1–32)
BACTERIA UR QL AUTO: ABNORMAL /HPF
BASOPHILS # BLD AUTO: 0.03 10*3/MM3 (ref 0–0.2)
BASOPHILS NFR BLD AUTO: 0.6 % (ref 0–1.5)
BILIRUB SERPL-MCNC: 0.6 MG/DL (ref 0–1.2)
BILIRUB UR QL STRIP: NEGATIVE
BUN SERPL-MCNC: 8 MG/DL (ref 6–20)
BUN/CREAT SERPL: 14.3 (ref 7–25)
CALCIUM SPEC-SCNC: 9.2 MG/DL (ref 8.6–10.5)
CHLORIDE SERPL-SCNC: 104 MMOL/L (ref 98–107)
CLARITY UR: CLEAR
CO2 SERPL-SCNC: 25 MMOL/L (ref 22–29)
COLOR UR: YELLOW
CREAT SERPL-MCNC: 0.56 MG/DL (ref 0.57–1)
DEPRECATED RDW RBC AUTO: 45.5 FL (ref 37–54)
EOSINOPHIL # BLD AUTO: 0.04 10*3/MM3 (ref 0–0.4)
EOSINOPHIL NFR BLD AUTO: 0.8 % (ref 0.3–6.2)
ERYTHROCYTE [DISTWIDTH] IN BLOOD BY AUTOMATED COUNT: 16.4 % (ref 12.3–15.4)
GFR SERPL CREATININE-BSD FRML MDRD: 131 ML/MIN/1.73
GLOBULIN UR ELPH-MCNC: 2.6 GM/DL
GLUCOSE SERPL-MCNC: 82 MG/DL (ref 65–99)
GLUCOSE UR STRIP-MCNC: NEGATIVE MG/DL
HCT VFR BLD AUTO: 35.5 % (ref 34–46.6)
HGB BLD-MCNC: 10.4 G/DL (ref 12–15.9)
HGB UR QL STRIP.AUTO: ABNORMAL
HYALINE CASTS UR QL AUTO: ABNORMAL /LPF
IMM GRANULOCYTES # BLD AUTO: 0.02 10*3/MM3 (ref 0–0.05)
IMM GRANULOCYTES NFR BLD AUTO: 0.4 % (ref 0–0.5)
KETONES UR QL STRIP: NEGATIVE
LEUKOCYTE ESTERASE UR QL STRIP.AUTO: NEGATIVE
LYMPHOCYTES # BLD AUTO: 1.02 10*3/MM3 (ref 0.7–3.1)
LYMPHOCYTES NFR BLD AUTO: 19.4 % (ref 19.6–45.3)
MCH RBC QN AUTO: 23.2 PG (ref 26.6–33)
MCHC RBC AUTO-ENTMCNC: 29.3 G/DL (ref 31.5–35.7)
MCV RBC AUTO: 79.1 FL (ref 79–97)
MONOCYTES # BLD AUTO: 0.41 10*3/MM3 (ref 0.1–0.9)
MONOCYTES NFR BLD AUTO: 7.8 % (ref 5–12)
NEUTROPHILS NFR BLD AUTO: 3.73 10*3/MM3 (ref 1.7–7)
NEUTROPHILS NFR BLD AUTO: 71 % (ref 42.7–76)
NITRITE UR QL STRIP: NEGATIVE
NRBC BLD AUTO-RTO: 0 /100 WBC (ref 0–0.2)
PH UR STRIP.AUTO: 6.5 [PH] (ref 5–8)
PLATELET # BLD AUTO: 264 10*3/MM3 (ref 140–450)
PMV BLD AUTO: 9.3 FL (ref 6–12)
POTASSIUM SERPL-SCNC: 3.5 MMOL/L (ref 3.5–5.2)
PROT SERPL-MCNC: 6.6 G/DL (ref 6–8.5)
PROT UR QL STRIP: NEGATIVE
RBC # BLD AUTO: 4.49 10*6/MM3 (ref 3.77–5.28)
RBC # UR: ABNORMAL /HPF
REF LAB TEST METHOD: ABNORMAL
SODIUM SERPL-SCNC: 137 MMOL/L (ref 136–145)
SP GR UR STRIP: 1.02 (ref 1–1.03)
SQUAMOUS #/AREA URNS HPF: ABNORMAL /HPF
TROPONIN T SERPL-MCNC: <0.01 NG/ML (ref 0–0.03)
UROBILINOGEN UR QL STRIP: ABNORMAL
WBC # BLD AUTO: 5.25 10*3/MM3 (ref 3.4–10.8)
WBC UR QL AUTO: ABNORMAL /HPF

## 2021-05-21 PROCEDURE — 85025 COMPLETE CBC W/AUTO DIFF WBC: CPT | Performed by: PHYSICIAN ASSISTANT

## 2021-05-21 PROCEDURE — 0 GADOBENATE DIMEGLUMINE 529 MG/ML SOLUTION: Performed by: EMERGENCY MEDICINE

## 2021-05-21 PROCEDURE — 80053 COMPREHEN METABOLIC PANEL: CPT | Performed by: PHYSICIAN ASSISTANT

## 2021-05-21 PROCEDURE — 99283 EMERGENCY DEPT VISIT LOW MDM: CPT

## 2021-05-21 PROCEDURE — 81001 URINALYSIS AUTO W/SCOPE: CPT | Performed by: PHYSICIAN ASSISTANT

## 2021-05-21 PROCEDURE — 84484 ASSAY OF TROPONIN QUANT: CPT | Performed by: PHYSICIAN ASSISTANT

## 2021-05-21 PROCEDURE — 70553 MRI BRAIN STEM W/O & W/DYE: CPT

## 2021-05-21 PROCEDURE — A9577 INJ MULTIHANCE: HCPCS | Performed by: EMERGENCY MEDICINE

## 2021-05-21 RX ORDER — SODIUM CHLORIDE 0.9 % (FLUSH) 0.9 %
10 SYRINGE (ML) INJECTION AS NEEDED
Status: DISCONTINUED | OUTPATIENT
Start: 2021-05-21 | End: 2021-05-21 | Stop reason: HOSPADM

## 2021-05-21 RX ADMIN — GADOBENATE DIMEGLUMINE 15 ML: 529 INJECTION, SOLUTION INTRAVENOUS at 19:05

## 2021-05-22 NOTE — ED PROVIDER NOTES
Subjective   Patient is a 25-year-old female who presents emergency room today with complaints of intermittent tingling in her left arm in addition to numbness in her right ring finger and pinky.  Patient states that she has had these symptoms intermittently since 2006.  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  She reports that since this time she has continued to have these symptoms intermittently and no one has been able to determine why her symptoms occur.  She denies anything specific that makes her symptoms better or worse.  She states that her symptoms come and go and typically resolve on their own.  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  She states that every time she presents the emergency room she has to wait and that her symptoms resolve prior to being seen or imaging performed.  She denies any additional symptoms on interview and exam.          Review of Systems   Constitutional: Negative for chills, fatigue and fever.   HENT: Negative.    Eyes: Positive for visual disturbance.   Respiratory: Negative.    Cardiovascular: Negative.    Gastrointestinal: Negative.    Genitourinary: Negative.    Musculoskeletal: Negative.    Skin: Negative.    Neurological: Positive for numbness.   All other systems reviewed and are negative.      No past medical history on file.    No Known Allergies    No past surgical history on file.    No family history on file.    Social History     Socioeconomic History   • Marital status:      Spouse name: Not on file   • Number of children: Not on file   • Years of education: Not on file   • Highest  education level: Not on file           Objective   Physical Exam  Vitals and nursing note reviewed.   Constitutional:       General: She is not in acute distress.     Appearance: She is well-developed. She is not ill-appearing or toxic-appearing.   HENT:      Head: Normocephalic and atraumatic.      Nose: Nose normal.      Mouth/Throat:      Mouth: Mucous membranes are moist.   Eyes:      Extraocular Movements: Extraocular movements intact.      Conjunctiva/sclera: Conjunctivae normal.   Cardiovascular:      Rate and Rhythm: Normal rate and regular rhythm.      Pulses: Normal pulses.   Pulmonary:      Effort: Pulmonary effort is normal. No respiratory distress.      Breath sounds: Normal breath sounds.   Abdominal:      General: There is no distension.      Tenderness: There is no abdominal tenderness.   Musculoskeletal:         General: Normal range of motion.      Cervical back: Normal range of motion and neck supple. No tenderness.   Skin:     General: Skin is warm and dry.   Neurological:      General: No focal deficit present.      Mental Status: She is alert.      Motor: Motor function is intact.      Coordination: Coordination is intact.      Comments: Patient with decreased sensation in her right pinky and ring finger.    Psychiatric:         Behavior: Behavior normal.         Thought Content: Thought content normal.         Judgment: Judgment normal.         Procedures           ED Course  ED Course as of May 21 2257   Fri May 21, 2021   1958 Reviewed imaging and labs with patient.  Patient does state that she is on her menstrual period and that she has history of anemia.    [JG]   7807 Patient presents ED for evaluation of recurrent symptoms of numbness and tingling in her extremities.  No acute or emergent findings demonstrated on labs or urinalysis   MRI of the brain consistent with previous findings of 6 mm x 4 mm x 7 mm lipoma.  No additional acute or emergent findings on MRI of the brain with and  without contrast.  This has not changed since previous imaging in 2019.  Patient asymptomatic during her stay in the ER with resolution of initial symptoms.  Patient is afebrile, nontoxic appearing, vital signs stable and able to maintain O2 sats of 98% on room air. Patient will be discharged home with outpatient follow up to their primary care provider as recommended which she has called and scheduled for Monday of next week. Patient is agreeable to plan of care of outpatient follow up, provided clear return precautions and demonstrated understanding.    [JG]      ED Course User Index  [JG] Peyman Luna, PA      Recent Results (from the past 24 hour(s))   Urinalysis With Microscopic If Indicated (No Culture) - Urine, Clean Catch    Collection Time: 05/21/21  5:46 PM    Specimen: Urine, Clean Catch   Result Value Ref Range    Color, UA Yellow Yellow, Straw    Appearance, UA Clear Clear    pH, UA 6.5 5.0 - 8.0    Specific Gravity, UA 1.016 1.001 - 1.030    Glucose, UA Negative Negative    Ketones, UA Negative Negative    Bilirubin, UA Negative Negative    Blood, UA Moderate (2+) (A) Negative    Protein, UA Negative Negative    Leuk Esterase, UA Negative Negative    Nitrite, UA Negative Negative    Urobilinogen, UA 1.0 E.U./dL 0.2 - 1.0 E.U./dL   CBC Auto Differential    Collection Time: 05/21/21  5:46 PM    Specimen: Blood   Result Value Ref Range    WBC 5.25 3.40 - 10.80 10*3/mm3    RBC 4.49 3.77 - 5.28 10*6/mm3    Hemoglobin 10.4 (L) 12.0 - 15.9 g/dL    Hematocrit 35.5 34.0 - 46.6 %    MCV 79.1 79.0 - 97.0 fL    MCH 23.2 (L) 26.6 - 33.0 pg    MCHC 29.3 (L) 31.5 - 35.7 g/dL    RDW 16.4 (H) 12.3 - 15.4 %    RDW-SD 45.5 37.0 - 54.0 fl    MPV 9.3 6.0 - 12.0 fL    Platelets 264 140 - 450 10*3/mm3    Neutrophil % 71.0 42.7 - 76.0 %    Lymphocyte % 19.4 (L) 19.6 - 45.3 %    Monocyte % 7.8 5.0 - 12.0 %    Eosinophil % 0.8 0.3 - 6.2 %    Basophil % 0.6 0.0 - 1.5 %    Immature Grans % 0.4 0.0 - 0.5 %    Neutrophils,  Absolute 3.73 1.70 - 7.00 10*3/mm3    Lymphocytes, Absolute 1.02 0.70 - 3.10 10*3/mm3    Monocytes, Absolute 0.41 0.10 - 0.90 10*3/mm3    Eosinophils, Absolute 0.04 0.00 - 0.40 10*3/mm3    Basophils, Absolute 0.03 0.00 - 0.20 10*3/mm3    Immature Grans, Absolute 0.02 0.00 - 0.05 10*3/mm3    nRBC 0.0 0.0 - 0.2 /100 WBC   Urinalysis, Microscopic Only - Urine, Clean Catch    Collection Time: 05/21/21  5:46 PM    Specimen: Urine, Clean Catch   Result Value Ref Range    RBC, UA 7-12 (A) None Seen, 0-2 /HPF    WBC, UA 0-2 None Seen, 0-2 /HPF    Bacteria, UA None Seen None Seen, Trace /HPF    Squamous Epithelial Cells, UA 0-2 None Seen, 0-2 /HPF    Hyaline Casts, UA 0-6 0 - 6 /LPF    Methodology Automated Microscopy    Comprehensive Metabolic Panel    Collection Time: 05/21/21  7:20 PM    Specimen: Blood   Result Value Ref Range    Glucose 82 65 - 99 mg/dL    BUN 8 6 - 20 mg/dL    Creatinine 0.56 (L) 0.57 - 1.00 mg/dL    Sodium 137 136 - 145 mmol/L    Potassium 3.5 3.5 - 5.2 mmol/L    Chloride 104 98 - 107 mmol/L    CO2 25.0 22.0 - 29.0 mmol/L    Calcium 9.2 8.6 - 10.5 mg/dL    Total Protein 6.6 6.0 - 8.5 g/dL    Albumin 4.00 3.50 - 5.20 g/dL    ALT (SGPT) 13 1 - 33 U/L    AST (SGOT) 14 1 - 32 U/L    Alkaline Phosphatase 59 39 - 117 U/L    Total Bilirubin 0.6 0.0 - 1.2 mg/dL    eGFR Non African Amer 131 >60 mL/min/1.73    Globulin 2.6 gm/dL    A/G Ratio 1.5 g/dL    BUN/Creatinine Ratio 14.3 7.0 - 25.0    Anion Gap 8.0 5.0 - 15.0 mmol/L   Troponin    Collection Time: 05/21/21  7:20 PM    Specimen: Blood   Result Value Ref Range    Troponin T <0.010 0.000 - 0.030 ng/mL     Note: In addition to lab results from this visit, the labs listed above may include labs taken at another facility or during a different encounter within the last 24 hours. Please correlate lab times with ED admission and discharge times for further clarification of the services performed during this visit.    MRI Brain With & Without Contrast   Final  Result      1. There is a  partly empty sella. The pituitary enhances homogeneously, and there is no evidence of a focal pituitary lesion. The pituitary infundibulum is midline.   2. Well-circumscribed focus of fatty signal intensity superior and posterior to the sella just inferior to the mamillary bodies as described above, likely reflecting a lipoma.   3. Otherwise negative MRI of the brain without contrast, with no acute abnormality.      Signer Name: Karlee Gallardo MD    Signed: 5/21/2021 7:40 PM    Workstation Name: [id number]     [facility]        Vitals:    05/21/21 1649 05/21/21 1651 05/21/21 1700 05/21/21 1701   BP: 105/46      BP Location:       Patient Position:       Pulse:  70  68   Resp:       Temp:       TempSrc:       SpO2:  98% 98%    Weight:       Height:         Medications   sodium chloride 0.9 % flush 10 mL (has no administration in time range)   gadobenate dimeglumine (MULTIHANCE) injection 15 mL (15 mL Intravenous Given 5/21/21 1905)     ECG/EMG Results (last 24 hours)     ** No results found for the last 24 hours. **        No orders to display                                          MDM  Number of Diagnoses or Management Options  Lipoma of head: established and worsening  Paresthesia of right upper extremity: established and worsening  Tingling of left upper extremity: new and requires workup     Amount and/or Complexity of Data Reviewed  Clinical lab tests: reviewed  Tests in the radiology section of CPT®: reviewed    Risk of Complications, Morbidity, and/or Mortality  Presenting problems: high  Diagnostic procedures: moderate  Management options: moderate    Patient Progress  Patient progress: improved      Final diagnoses:   Lipoma of head   Paresthesia of right upper extremity   Tingling of left upper extremity       ED Disposition  ED Disposition     ED Disposition Condition Comment    Discharge Stable           Karlie Aguilar, APRN  [address]  [address]  [facility] [address]  [phone]      Follow-up as scheduled with your primary care on Monday    [facility] Emergency Department  [address] [address] [phone]  Go to   If symptoms worsen         Medication List      No changes were made to your prescriptions during this visit.          Peyman Luna, PA  05/21/21 9211

## 2021-05-24 ENCOUNTER — OFFICE VISIT (OUTPATIENT)
Dept: INTERNAL MEDICINE | Facility: CLINIC | Age: 26
End: 2021-05-24

## 2021-05-24 VITALS
RESPIRATION RATE: 18 BRPM | WEIGHT: 193 LBS | OXYGEN SATURATION: 99 % | BODY MASS INDEX: 32.15 KG/M2 | HEART RATE: 69 BPM | HEIGHT: 65 IN | SYSTOLIC BLOOD PRESSURE: 100 MMHG | TEMPERATURE: 97.1 F | DIASTOLIC BLOOD PRESSURE: 70 MMHG

## 2021-05-24 DIAGNOSIS — R20.9 SKIN SENSATION DISTURBANCE: Primary | ICD-10-CM

## 2021-05-24 DIAGNOSIS — L03.113 CELLULITIS OF RIGHT ARM: ICD-10-CM

## 2021-05-24 PROCEDURE — 84466 ASSAY OF TRANSFERRIN: CPT | Performed by: NURSE PRACTITIONER

## 2021-05-24 PROCEDURE — 82607 VITAMIN B-12: CPT | Performed by: NURSE PRACTITIONER

## 2021-05-24 PROCEDURE — 85007 BL SMEAR W/DIFF WBC COUNT: CPT | Performed by: NURSE PRACTITIONER

## 2021-05-24 PROCEDURE — 85025 COMPLETE CBC W/AUTO DIFF WBC: CPT | Performed by: NURSE PRACTITIONER

## 2021-05-24 PROCEDURE — 99214 OFFICE O/P EST MOD 30 MIN: CPT | Performed by: NURSE PRACTITIONER

## 2021-05-24 PROCEDURE — 80053 COMPREHEN METABOLIC PANEL: CPT | Performed by: NURSE PRACTITIONER

## 2021-05-24 PROCEDURE — 83540 ASSAY OF IRON: CPT | Performed by: NURSE PRACTITIONER

## 2021-05-24 RX ORDER — CEPHALEXIN 500 MG/1
500 CAPSULE ORAL 3 TIMES DAILY
Qty: 30 CAPSULE | Refills: 0 | Status: SHIPPED | OUTPATIENT
Start: 2021-05-24 | End: 2021-06-03

## 2021-05-24 NOTE — PROGRESS NOTES
"Chief Complaint   Patient presents with   • Numbness     tingling right side, 4th and 5th finger right hand    • Arm Swelling       HPI  Liliya Renteria is a 25 y.o. female presents with tingling of her right arm and 4th and 5th fingers of her right hand. Pt states this has been an ongoing issue since about 2006.  This occurs at random times and radiates up the arm into her head.  She went to the ER on Friday because the tingling started in her left hand and went up the arm and went into her face and she had difficulty seeing.  She had an MRI in the ER which was negative for a stroke.  She states that she's tired of this happening and ready for an answer as to why it keeps happening.  When she lived in [place] ago she was told she had mini strokes.    Pt also complains of right arm swelling.  It's the same arm that she had labwork done and IV in this past weekend.  The arm is red and warm as well.      The following portions of the patient's history were reviewed and updated as appropriate: allergies, current medications, past family history, past medical history, past social history, past surgical history and problem list.    Subjective  Review of Systems   Constitutional: Negative for activity change, appetite change and fatigue.   HENT: Negative for congestion.    Cardiovascular: Negative for chest pain and leg swelling.   Gastrointestinal: Negative for abdominal pain.   Musculoskeletal: Arthralgias: right arm, tingling in fingers.   Skin: Positive for color change (redness of right forearm).   Neurological: Negative for dizziness, weakness and confusion.   Psychiatric/Behavioral: Negative for behavioral problems and decreased concentration.       Objective  Visit Vitals  /70   Pulse 69   Temp 97.1 °F (36.2 °C) (Temporal)   Resp 18   Ht 165.1 cm (65\")   Wt 87.5 kg (193 lb)   SpO2 99%   BMI 32.12 kg/m²        Physical Exam  Vitals and nursing note reviewed.   HENT:      Head: Normocephalic.   Eyes:      " Pupils: Pupils are equal, round, and reactive to light.   Pulmonary:      Effort: Pulmonary effort is normal.   Musculoskeletal:      Right wrist: No swelling. Normal range of motion.      Left wrist: No swelling or tenderness.   Skin:     General: Skin is warm and dry.      Capillary Refill: Capillary refill takes less than 2 seconds.      Comments: Right posterior forearm with large induration of redness, hot to touch, bruise noted where IV was in place, no streaking   Neurological:      General: No focal deficit present.      Mental Status: She is alert and oriented to person, place, and time.   Psychiatric:         Mood and Affect: Mood normal.         Behavior: Behavior normal.         Thought Content: Thought content normal.          Procedures     Assessment and Plan  Diagnoses and all orders for this visit:    1. Skin sensation disturbance (Primary)  -     Ambulatory Referral to Neurology  -     Vitamin B12  -     CBC & Differential  -     Comprehensive Metabolic Panel  -     EMG & Nerve Conduction Test; Future    2. Cellulitis of right arm  -     cephalexin (Keflex) 500 MG capsule; Take 1 capsule by mouth 3 (Three) Times a Day for 10 days.  Dispense: 30 capsule; Refill: 0    chronic problem  Refer to neurology  Check labs  Schedule NCS with needle EMG r/o carpel tunnel  Keflex for cellulitis    Return in about 4 weeks (around 6/21/2021) for Recheck tingling in hands and discuss NCS.    KANE Troy

## 2021-05-25 DIAGNOSIS — D50.8 OTHER IRON DEFICIENCY ANEMIA: Primary | ICD-10-CM

## 2021-05-25 DIAGNOSIS — D64.9 ANEMIA, UNSPECIFIED TYPE: Primary | ICD-10-CM

## 2021-05-25 LAB
ALBUMIN SERPL-MCNC: 4.3 G/DL (ref 3.5–5.2)
ALBUMIN/GLOB SERPL: 1.7 G/DL
ALP SERPL-CCNC: 64 U/L (ref 39–117)
ALT SERPL W P-5'-P-CCNC: 13 U/L (ref 1–33)
ANION GAP SERPL CALCULATED.3IONS-SCNC: 10.9 MMOL/L (ref 5–15)
ANISOCYTOSIS BLD QL: ABNORMAL
AST SERPL-CCNC: 17 U/L (ref 1–32)
BILIRUB SERPL-MCNC: 0.4 MG/DL (ref 0–1.2)
BUN SERPL-MCNC: 9 MG/DL (ref 6–20)
BUN/CREAT SERPL: 15.5 (ref 7–25)
CALCIUM SPEC-SCNC: 8.8 MG/DL (ref 8.6–10.5)
CHLORIDE SERPL-SCNC: 103 MMOL/L (ref 98–107)
CO2 SERPL-SCNC: 24.1 MMOL/L (ref 22–29)
CREAT SERPL-MCNC: 0.58 MG/DL (ref 0.57–1)
DEPRECATED RDW RBC AUTO: 42.9 FL (ref 37–54)
EOSINOPHIL # BLD MANUAL: 0.07 10*3/MM3 (ref 0–0.4)
EOSINOPHIL NFR BLD MANUAL: 2.1 % (ref 0.3–6.2)
ERYTHROCYTE [DISTWIDTH] IN BLOOD BY AUTOMATED COUNT: 16.1 % (ref 12.3–15.4)
GFR SERPL CREATININE-BSD FRML MDRD: 126 ML/MIN/1.73
GLOBULIN UR ELPH-MCNC: 2.5 GM/DL
GLUCOSE SERPL-MCNC: 60 MG/DL (ref 65–99)
HCT VFR BLD AUTO: 30.6 % (ref 34–46.6)
HGB BLD-MCNC: 9.5 G/DL (ref 12–15.9)
IRON 24H UR-MRATE: 22 MCG/DL (ref 37–145)
IRON SATN MFR SERPL: 5 % (ref 20–50)
LYMPHOCYTES # BLD MANUAL: 0.82 10*3/MM3 (ref 0.7–3.1)
LYMPHOCYTES NFR BLD MANUAL: 25.8 % (ref 19.6–45.3)
LYMPHOCYTES NFR BLD MANUAL: 3.1 % (ref 5–12)
MCH RBC QN AUTO: 23.1 PG (ref 26.6–33)
MCHC RBC AUTO-ENTMCNC: 31 G/DL (ref 31.5–35.7)
MCV RBC AUTO: 74.3 FL (ref 79–97)
MICROCYTES BLD QL: ABNORMAL
MONOCYTES # BLD AUTO: 0.1 10*3/MM3 (ref 0.1–0.9)
NEUTROPHILS # BLD AUTO: 2.18 10*3/MM3 (ref 1.7–7)
NEUTROPHILS NFR BLD MANUAL: 69.1 % (ref 42.7–76)
PLAT MORPH BLD: NORMAL
PLATELET # BLD AUTO: 266 10*3/MM3 (ref 140–450)
PMV BLD AUTO: 9.3 FL (ref 6–12)
POIKILOCYTOSIS BLD QL SMEAR: ABNORMAL
POTASSIUM SERPL-SCNC: 4.1 MMOL/L (ref 3.5–5.2)
PROT SERPL-MCNC: 6.8 G/DL (ref 6–8.5)
RBC # BLD AUTO: 4.12 10*6/MM3 (ref 3.77–5.28)
SODIUM SERPL-SCNC: 138 MMOL/L (ref 136–145)
TIBC SERPL-MCNC: 466 MCG/DL (ref 298–536)
TRANSFERRIN SERPL-MCNC: 313 MG/DL (ref 200–360)
VIT B12 BLD-MCNC: <150 PG/ML (ref 211–946)
WBC # BLD AUTO: 3.16 10*3/MM3 (ref 3.4–10.8)
WBC MORPH BLD: NORMAL

## 2021-05-25 RX ORDER — FERROUS SULFATE 325(65) MG
325 TABLET ORAL 2 TIMES DAILY WITH MEALS
Qty: 60 TABLET | Refills: 5 | Status: SHIPPED | OUTPATIENT
Start: 2021-05-25 | End: 2021-06-29

## 2021-06-02 ENCOUNTER — CLINICAL SUPPORT (OUTPATIENT)
Dept: INTERNAL MEDICINE | Facility: CLINIC | Age: 26
End: 2021-06-02

## 2021-06-02 DIAGNOSIS — E53.8 B12 DEFICIENCY: Primary | ICD-10-CM

## 2021-06-02 PROCEDURE — 96372 THER/PROPH/DIAG INJ SC/IM: CPT | Performed by: NURSE PRACTITIONER

## 2021-06-02 RX ORDER — CYANOCOBALAMIN 1000 UG/ML
1000 INJECTION, SOLUTION INTRAMUSCULAR; SUBCUTANEOUS
Status: DISCONTINUED | OUTPATIENT
Start: 2021-06-02 | End: 2021-12-23

## 2021-06-02 RX ADMIN — CYANOCOBALAMIN 1000 MCG: 1000 INJECTION, SOLUTION INTRAMUSCULAR; SUBCUTANEOUS at 12:15

## 2021-06-08 ENCOUNTER — CLINICAL SUPPORT (OUTPATIENT)
Dept: INTERNAL MEDICINE | Facility: CLINIC | Age: 26
End: 2021-06-08

## 2021-06-08 DIAGNOSIS — E53.8 B12 DEFICIENCY: Primary | ICD-10-CM

## 2021-06-08 PROCEDURE — 96372 THER/PROPH/DIAG INJ SC/IM: CPT | Performed by: NURSE PRACTITIONER

## 2021-06-08 RX ADMIN — CYANOCOBALAMIN 1000 MCG: 1000 INJECTION, SOLUTION INTRAMUSCULAR; SUBCUTANEOUS at 13:52

## 2021-06-29 ENCOUNTER — OFFICE VISIT (OUTPATIENT)
Dept: NEUROLOGY | Facility: CLINIC | Age: 26
End: 2021-06-29

## 2021-06-29 VITALS — HEIGHT: 65 IN | HEART RATE: 82 BPM | OXYGEN SATURATION: 97 % | WEIGHT: 190 LBS | BODY MASS INDEX: 31.65 KG/M2

## 2021-06-29 DIAGNOSIS — G43.109 MIGRAINE WITH AURA AND WITHOUT STATUS MIGRAINOSUS, NOT INTRACTABLE: ICD-10-CM

## 2021-06-29 DIAGNOSIS — M54.2 NECK PAIN: Primary | ICD-10-CM

## 2021-06-29 PROCEDURE — 99204 OFFICE O/P NEW MOD 45 MIN: CPT | Performed by: PHYSICIAN ASSISTANT

## 2021-06-29 RX ORDER — SUMATRIPTAN 100 MG/1
100 TABLET, FILM COATED ORAL ONCE AS NEEDED
Qty: 9 TABLET | Refills: 5 | Status: SHIPPED | OUTPATIENT
Start: 2021-06-29 | End: 2021-12-23

## 2021-06-29 NOTE — PROGRESS NOTES
"Subjective     Chief Complaint: visual changes      History of Present Illness   Liliya Hook is a 25 y.o. female who comes to clinic today for evaluation of episodes of visual disturbance She initially noted symptoms several years ago marked by spells of changes in vision described as progressive loss of peripheral vision and  scintillating scotoma bilaterally. This typically lasts for 15-20 minutes. She notes that the spells have been more frequent over time, now typically occurring 2-3 times a month. During the spells, she states that she feels 'off'. Additionally, there may be an associated headache, right sided paresthesias, and aphasia. Additionally, she reports neck pain. Lying down often helps. There are no clear triggering or aggravating factors.    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 Screening blood work was notable for a B12 level of <150 and iron deficiency anemia, for which she is being treated through her PCP.      I have reviewed and confirmed the past family, social and medical history as accurate on 6/30/21.     Review of Systems   Constitutional: Negative.    HENT: Negative.    Eyes: Negative.    Respiratory: Negative.    Cardiovascular: Negative.    Gastrointestinal: Negative.    Endocrine: Negative.    Genitourinary: Negative.    Musculoskeletal: Negative.    Skin: Negative.    Allergic/Immunologic: Negative.    Hematological: Negative.        Objective     Pulse 82   Ht 165.1 cm (65\")   Wt 86.2 kg (190 lb)   SpO2 97%   BMI 31.62 kg/m²     General appearance today is normal.   Peripheral pulses were present and symmetric.  The ophthalmoscopic exam today is unremarkable. The discs and posterior elements are unremarkable.      Physical " Exam  Neurological:      Coordination: Finger-Nose-Finger Test and Heel to Shin Test normal.      Gait: Gait is intact.      Deep Tendon Reflexes: Strength normal.      Reflex Scores:       Patellar reflexes are 2+ on the right side and 2+ on the left side.  Psychiatric:         Speech: Speech normal.          Neurologic Exam     Mental Status   Speech: speech is normal   Level of consciousness: alert  Normal comprehension.     Cranial Nerves   Cranial nerves II through XII intact.     Motor Exam   Muscle bulk: normal  Overall muscle tone: normal    Strength   Strength 5/5 throughout.     Sensory Exam   Light touch normal.     Gait, Coordination, and Reflexes     Gait  Gait: normal    Coordination   Finger to nose coordination: normal  Heel to shin coordination: normal    Tremor   Resting tremor: absent    Reflexes   Right patellar: 2+  Left patellar: 2+          Assessment/Plan   Diagnoses and all orders for this visit:    1. Neck pain (Primary)  -     MRI Cervical Spine Without Contrast; Future    2. Migraine with aura and without status migrainosus, not intractable    Other orders  -     SUMAtriptan (IMITREX) 100 MG tablet; Take 1 tablet by mouth 1 (One) Time As Needed for Migraine. Take one tablet at onset of headache. May repeat dose one time in 2 hours if headache not relieved.  Dispense: 9 tablet; Refill: 5          Discussion/Summary   Liliya Hook comes to clinic today for evaluation of episodes of visual disturbance. Her history is concerning for complex migraines. This was discussed in detail. It was elected to obtain an MRI of the cervical spine given her associated neck pain. After discussing potential treatment options, it was elected to add sumatriptan PRN. She will then follow up in 6 months , or sooner if needed.   Total time of visit today: 45 minutes. As part of this visit I reviewed prior lab results, reviewed radiology results and reviewed radiology images. I also discussed diagnosis,  prognosis, diagnostic testing, evaluation, current status, treatment options and management as discussed above.           Ivet Toure PA-C

## 2021-07-19 ENCOUNTER — TELEPHONE (OUTPATIENT)
Dept: NEUROLOGY | Facility: CLINIC | Age: 26
End: 2021-07-19

## 2021-07-19 NOTE — TELEPHONE ENCOUNTER
Call  TEJA   Relationship: [facility]     Best call back number: [phone]      Do you know the name of the person who called: TEJA FROM [facility]     What was the call regarding: PEER TO PEER     Do you require a callback:  NO.    TEJA FROM [facility] CALLED TO LET YOU KNOW INS IS REQUESTING A PEER TO PEER . LETTER IS SCANNED AND SHE PUT NOTE  IN REFERRAL . IF YOU HAVE ANY QUESTIONS YOU CAN CALL HER AT NUMBER ABOVE .     PLEASE ADVISE .

## 2021-07-27 ENCOUNTER — HOSPITAL ENCOUNTER (OUTPATIENT)
Dept: MRI IMAGING | Facility: HOSPITAL | Age: 26
End: 2021-07-27

## 2021-07-27 NOTE — TELEPHONE ENCOUNTER
TJEA FROM  IS WANTING TO KNOW IF THE PEER TO PEER HAS BEEN DONE OR SCHEDULED. THEY DID CANCEL THE APPOINTMENT FOR TODAY.  PLEASE TEJA @ [phone]

## 2021-08-13 DIAGNOSIS — M54.2 NECK PAIN: Primary | ICD-10-CM

## 2021-11-29 ENCOUNTER — TELEPHONE (OUTPATIENT)
Dept: INTERNAL MEDICINE | Facility: CLINIC | Age: 26
End: 2021-11-29

## 2021-11-29 NOTE — TELEPHONE ENCOUNTER
Caller: Liliya Hook    Relationship to patient: Self    Best call back number: [phone]    Patient is needing: PATIENT HAS APPOINTMENT FOR FOLLOW UP FOR BLOOD CLOT IN LUNG AND TO DO SOME BLOOD WORK BUT WOULD LIKE TO SEE ABOUT TALKING TO NURSE ABOUT OPTIONS FOR PAIN     PLEASE ADVISE ASAP

## 2021-11-29 NOTE — TELEPHONE ENCOUNTER
The last time she saw me was one year ago. She needs to see me to discuss options for pain medication. I don't have any records on her having a blood clot. Can you get records from wherever she was seen. thanks

## 2021-11-29 NOTE — TELEPHONE ENCOUNTER
Pt advised. Records requested from [facility] in [place]. Pt may schedule a sooner appt with another provider.

## 2021-12-01 ENCOUNTER — TELEPHONE (OUTPATIENT)
Dept: INTERNAL MEDICINE | Facility: CLINIC | Age: 26
End: 2021-12-01

## 2021-12-01 ENCOUNTER — LAB (OUTPATIENT)
Dept: LAB | Facility: HOSPITAL | Age: 26
End: 2021-12-01

## 2021-12-01 ENCOUNTER — OFFICE VISIT (OUTPATIENT)
Dept: INTERNAL MEDICINE | Facility: CLINIC | Age: 26
End: 2021-12-01

## 2021-12-01 VITALS
OXYGEN SATURATION: 98 % | RESPIRATION RATE: 18 BRPM | HEIGHT: 65 IN | SYSTOLIC BLOOD PRESSURE: 128 MMHG | TEMPERATURE: 96.9 F | DIASTOLIC BLOOD PRESSURE: 94 MMHG | HEART RATE: 98 BPM | BODY MASS INDEX: 31.36 KG/M2 | WEIGHT: 188.2 LBS

## 2021-12-01 DIAGNOSIS — M79.609 PAIN IN EXTREMITY AT MULTIPLE SITES: Primary | ICD-10-CM

## 2021-12-01 DIAGNOSIS — M79.609 PAIN IN EXTREMITY AT MULTIPLE SITES: ICD-10-CM

## 2021-12-01 DIAGNOSIS — D69.6 THROMBOCYTOPENIA (HCC): ICD-10-CM

## 2021-12-01 DIAGNOSIS — M35.9 AUTOIMMUNE DISEASE (HCC): Primary | ICD-10-CM

## 2021-12-01 DIAGNOSIS — M25.50 GENERALIZED JOINT PAIN: Primary | ICD-10-CM

## 2021-12-01 DIAGNOSIS — M25.50 GENERALIZED JOINT PAIN: ICD-10-CM

## 2021-12-01 DIAGNOSIS — D70.9 NEUTROPENIA, UNSPECIFIED TYPE (HCC): ICD-10-CM

## 2021-12-01 LAB — NT-PROBNP SERPL-MCNC: 28.2 PG/ML (ref 0–450)

## 2021-12-01 PROCEDURE — 86038 ANTINUCLEAR ANTIBODIES: CPT

## 2021-12-01 PROCEDURE — 86431 RHEUMATOID FACTOR QUANT: CPT | Performed by: PHYSICIAN ASSISTANT

## 2021-12-01 PROCEDURE — 86140 C-REACTIVE PROTEIN: CPT

## 2021-12-01 PROCEDURE — 36415 COLL VENOUS BLD VENIPUNCTURE: CPT

## 2021-12-01 PROCEDURE — 85652 RBC SED RATE AUTOMATED: CPT

## 2021-12-01 PROCEDURE — 83880 ASSAY OF NATRIURETIC PEPTIDE: CPT | Performed by: PHYSICIAN ASSISTANT

## 2021-12-01 PROCEDURE — 85025 COMPLETE CBC W/AUTO DIFF WBC: CPT

## 2021-12-01 PROCEDURE — 86225 DNA ANTIBODY NATIVE: CPT

## 2021-12-01 PROCEDURE — 99213 OFFICE O/P EST LOW 20 MIN: CPT | Performed by: PHYSICIAN ASSISTANT

## 2021-12-01 PROCEDURE — 84550 ASSAY OF BLOOD/URIC ACID: CPT

## 2021-12-01 PROCEDURE — 80053 COMPREHEN METABOLIC PANEL: CPT | Performed by: PHYSICIAN ASSISTANT

## 2021-12-01 RX ORDER — TRAMADOL HYDROCHLORIDE 50 MG/1
50 TABLET ORAL EVERY 6 HOURS PRN
Qty: 12 TABLET | Refills: 0 | Status: SHIPPED | OUTPATIENT
Start: 2021-12-01 | End: 2021-12-06

## 2021-12-01 NOTE — TELEPHONE ENCOUNTER
Consulted on patient during her appt today. She is having some significant joint pain with generalized edema and right hand swelling. History of recent PE and taking eliquis. Tylenol not helping and unable to take NSAIDs. Will call in tramadol for her pain. Meds and DONAVAN reviewed.

## 2021-12-01 NOTE — PROGRESS NOTES
"Subjective   Chief Complaint   Patient presents with   • Hand Pain     right index finger, cant straighten    • Lung Follow-up     pulmonary embolism    • Neutropenia   • Lupus     would like test to check for lupus      Liliya Hook is a 26 y.o. female here today for ***.    I have reviewed the following portions of the patient's history and confirmed they are accurate: {History and ROS:60808::\"allergies\",\"current medications\",\"past family history\",\"past medical history\",\"past social history\",\"past surgical history\",\"problem list\"}    I have personally completed the patient's review of systems.    Review of Systems    Current Outpatient Medications on File Prior to Visit   Medication Sig   • SUMAtriptan (IMITREX) 100 MG tablet Take 1 tablet by mouth 1 (One) Time As Needed for Migraine. Take one tablet at onset of headache. May repeat dose one time in 2 hours if headache not relieved.     Current Facility-Administered Medications on File Prior to Visit   Medication   • cyanocobalamin injection 1,000 mcg       Objective   Vitals:    12/01/21 1100   BP: 128/94   Pulse: 98   Resp: 18   Temp: 96.9 °F (36.1 °C)   SpO2: 98%   Weight: 85.4 kg (188 lb 3.2 oz)   Height: 165.1 cm (65\")     Body mass index is 31.32 kg/m².    Physical Exam    Assessment/Plan   Problem List Items Addressed This Visit     None             Current Outpatient Medications:   •  SUMAtriptan (IMITREX) 100 MG tablet, Take 1 tablet by mouth 1 (One) Time As Needed for Migraine. Take one tablet at onset of headache. May repeat dose one time in 2 hours if headache not relieved., Disp: 9 tablet, Rfl: 5    Current Facility-Administered Medications:   •  cyanocobalamin injection 1,000 mcg, 1,000 mcg, Intramuscular, Q28 Days, Benji Patrick APRN, 1,000 mcg at 06/08/21 1352       Plan of care reviewed with the patient at the conclusion of today's visit.  Education was provided regarding diagnosis, management, and any prescribed or recommended OTC " medications.  Patient verbalized understanding of and agreement with management plan.     No follow-ups on file.      Karlie Alexandre, KANE

## 2021-12-01 NOTE — PROGRESS NOTES
[facility]  [address]  [place] [address]  Dept: [phone]  Dept Fax: [phone]  Loc: [phone]  Loc Fax: [phone]    Liliya Hook  1995    Follow Up Office Visit Note    History of Present Illness:  Patient is a 26-year-old female in today post pulmonary embolism 2 months ago.  Patient went to [facility].  Patient was diagnosed with blood clot in her lungs.  Unclear as to where this was as we do not have records currently.  Patient on Eliquis now.  Patient also under the care of hematology for thrombocytopenia and neutropenia.  Patient presents today with swelling and pain in multiple joints throughout her body.  Patient has not been worked up for autoimmune disease.  Patient denies a history of autoimmune diseases.  Only medication patient is taking otherwise is Imitrex for paroxysmal migraines.  Patient denies any chest pain or shortness of breath.  Patient is having some leg swelling too.      The following portions of the patient's history were reviewed and updated as appropriate: allergies, current medications, past family history, past medical history, past social history, past surgical history, and problem list.    Medications:    Current Outpatient Medications:   •  SUMAtriptan (IMITREX) 100 MG tablet, Take 1 tablet by mouth 1 (One) Time As Needed for Migraine. Take one tablet at onset of headache. May repeat dose one time in 2 hours if headache not relieved., Disp: 9 tablet, Rfl: 5    Current Facility-Administered Medications:   •  cyanocobalamin injection 1,000 mcg, 1,000 mcg, Intramuscular, Q28 Days, Benji Patrick APRN, 1,000 mcg at 06/08/21 1352    Subjective  No Known Allergies     Past Medical History:   Diagnosis Date   • Anxiety    • Blood transfusion without reported diagnosis    • Cluster headache    • Dizziness    • Fainting    • Migraine headache    • Numbness and tingling    •  "Panic attacks    • Tension headache    • Weakness        Past Surgical History:   Procedure Laterality Date   • TUBAL ABDOMINAL LIGATION         Family History   Problem Relation Age of Onset   • No Known Problems Mother    • Hypertension Father    • Diabetes Maternal Grandmother    • Arthritis Maternal Grandmother         Social History     Socioeconomic History   • Marital status:    Tobacco Use   • Smoking status: Former Smoker     Types: Cigarettes     Quit date:      Years since quittin.9   • Smokeless tobacco: Never Used   Vaping Use   • Vaping Use: Never used   Substance and Sexual Activity   • Alcohol use: No   • Drug use: No   • Sexual activity: Defer       Review of Systems   Constitutional: Negative for activity change, chills, fatigue, fever and unexpected weight change.   HENT: Negative for congestion, ear pain, postnasal drip, sinus pressure and sore throat.    Eyes: Negative for pain, discharge and redness.   Respiratory: Negative for cough, shortness of breath and wheezing.    Cardiovascular: Positive for leg swelling. Negative for chest pain and palpitations.   Gastrointestinal: Negative for diarrhea, nausea and vomiting.   Endocrine: Negative for cold intolerance and heat intolerance.   Genitourinary: Negative for decreased urine volume and dysuria.   Musculoskeletal: Positive for arthralgias, back pain, myalgias and neck pain.   Skin: Negative for rash and wound.   Neurological: Negative for dizziness, light-headedness and headaches.   Hematological: Does not bruise/bleed easily.   Psychiatric/Behavioral: Negative for confusion, dysphoric mood and sleep disturbance. The patient is not nervous/anxious.          Objective  Vitals:    21 1100   BP: 128/94   Pulse: 98   Resp: 18   Temp: 96.9 °F (36.1 °C)   SpO2: 98%   Weight: 85.4 kg (188 lb 3.2 oz)   Height: 165.1 cm (65\")     Body mass index is 31.32 kg/m².     Physical Exam  Physical Exam  Vitals and nursing note reviewed. "   Constitutional:       General: She is not in acute distress.     Appearance: She is not ill-appearing.   HENT:      Head: Normocephalic.      Right Ear: Tympanic membrane, ear canal and external ear normal. There is no impacted cerumen.      Left Ear: Tympanic membrane, ear canal and external ear normal. There is no impacted cerumen.      Nose: No congestion or rhinorrhea.      Mouth/Throat:      Mouth: Mucous membranes are moist.      Pharynx: Oropharynx is clear. No oropharyngeal exudate or posterior oropharyngeal erythema.   Eyes:      General:         Right eye: No discharge.         Left eye: No discharge.      Extraocular Movements: Extraocular movements intact.      Conjunctiva/sclera: Conjunctivae normal.      Pupils: Pupils are equal, round, and reactive to light.   Cardiovascular:      Rate and Rhythm: Normal rate and regular rhythm.      Heart sounds: Normal heart sounds. No murmur heard.  No friction rub. No gallop.    Pulmonary:      Effort: Pulmonary effort is normal. No respiratory distress.      Breath sounds: Normal breath sounds. No wheezing.   Abdominal:      General: Bowel sounds are normal. There is no distension.      Palpations: Abdomen is soft. There is no mass.      Tenderness: There is no abdominal tenderness.   Musculoskeletal:         General: Swelling (1+BLE) present. Normal range of motion.      Cervical back: Normal range of motion. No tenderness.      Right lower leg: No edema.      Left lower leg: No edema.   Lymphadenopathy:      Cervical: No cervical adenopathy.   Skin:     Findings: No bruising, erythema or rash.   Neurological:      Mental Status: She is oriented to person, place, and time.      Gait: Gait normal.   Psychiatric:         Mood and Affect: Mood normal.         Behavior: Behavior normal.         Thought Content: Thought content normal.         Judgment: Judgment normal.         Diagnostic Data  Procedures    Assessment  Diagnoses and all orders for this  visit:    1. Pain in extremity at multiple sites (Primary)  -     Rheumatoid Factor  -     CBC w AUTO Differential; Future  -     Comprehensive Metabolic Panel  -     proBNP  -     Sedimentation Rate; Future  -     C-reactive protein; Future  -     ANETA; Future  -     Uric acid; Future    2. Neutropenia, unspecified type (HCC)    3. Thrombocytopenia (HCC)        Plan      1. Pain in extremity at multiple sites (Primary)- worse w/ swelling, obtain labs per above.    2. Neutropenia, unspecified type (HCC)- cont to f/u w/ hem/onc.    3. Thrombocytopenia (HCC)- cont to f/u w/ hem/onc.      Return in about 3 weeks (around 12/22/2021) for w/ Janneth.    Peyman Cavazos PA-C  12/01/2021

## 2021-12-02 LAB
ALBUMIN SERPL-MCNC: 4.3 G/DL (ref 3.5–5.2)
ALBUMIN/GLOB SERPL: 1.3 G/DL
ALP SERPL-CCNC: 86 U/L (ref 39–117)
ALT SERPL W P-5'-P-CCNC: 19 U/L (ref 1–33)
ANA SER QL: POSITIVE
ANION GAP SERPL CALCULATED.3IONS-SCNC: 10.9 MMOL/L (ref 5–15)
AST SERPL-CCNC: 30 U/L (ref 1–32)
BASOPHILS # BLD AUTO: 0.02 10*3/MM3 (ref 0–0.2)
BASOPHILS NFR BLD AUTO: 0.6 % (ref 0–1.5)
BILIRUB SERPL-MCNC: 0.5 MG/DL (ref 0–1.2)
BUN SERPL-MCNC: 11 MG/DL (ref 6–20)
BUN/CREAT SERPL: 15.5 (ref 7–25)
CALCIUM SPEC-SCNC: 9.3 MG/DL (ref 8.6–10.5)
CHLORIDE SERPL-SCNC: 105 MMOL/L (ref 98–107)
CHROMATIN AB SERPL-ACNC: <10 IU/ML (ref 0–14)
CO2 SERPL-SCNC: 24.1 MMOL/L (ref 22–29)
CREAT SERPL-MCNC: 0.71 MG/DL (ref 0.57–1)
CRP SERPL-MCNC: <0.3 MG/DL (ref 0–0.5)
DEPRECATED RDW RBC AUTO: 49.1 FL (ref 37–54)
DSDNA AB SER-ACNC: >300 IU/ML (ref 0–9)
EOSINOPHIL # BLD AUTO: 0.17 10*3/MM3 (ref 0–0.4)
EOSINOPHIL NFR BLD AUTO: 5.4 % (ref 0.3–6.2)
ERYTHROCYTE [DISTWIDTH] IN BLOOD BY AUTOMATED COUNT: 16.4 % (ref 12.3–15.4)
ERYTHROCYTE [SEDIMENTATION RATE] IN BLOOD: 22 MM/HR (ref 0–20)
GFR SERPL CREATININE-BSD FRML MDRD: 99 ML/MIN/1.73
GLOBULIN UR ELPH-MCNC: 3.4 GM/DL
GLUCOSE SERPL-MCNC: 87 MG/DL (ref 65–99)
HCT VFR BLD AUTO: 36.1 % (ref 34–46.6)
HGB BLD-MCNC: 12.1 G/DL (ref 12–15.9)
IMM GRANULOCYTES # BLD AUTO: 0.01 10*3/MM3 (ref 0–0.05)
IMM GRANULOCYTES NFR BLD AUTO: 0.3 % (ref 0–0.5)
LYMPHOCYTES # BLD AUTO: 0.91 10*3/MM3 (ref 0.7–3.1)
LYMPHOCYTES NFR BLD AUTO: 28.8 % (ref 19.6–45.3)
Lab: ABNORMAL
MCH RBC QN AUTO: 28.3 PG (ref 26.6–33)
MCHC RBC AUTO-ENTMCNC: 33.5 G/DL (ref 31.5–35.7)
MCV RBC AUTO: 84.3 FL (ref 79–97)
MONOCYTES # BLD AUTO: 0.32 10*3/MM3 (ref 0.1–0.9)
MONOCYTES NFR BLD AUTO: 10.1 % (ref 5–12)
NEUTROPHILS NFR BLD AUTO: 1.73 10*3/MM3 (ref 1.7–7)
NEUTROPHILS NFR BLD AUTO: 54.8 % (ref 42.7–76)
NRBC BLD AUTO-RTO: 0 /100 WBC (ref 0–0.2)
PLATELET # BLD AUTO: 167 10*3/MM3 (ref 140–450)
PMV BLD AUTO: 10.2 FL (ref 6–12)
POTASSIUM SERPL-SCNC: 4.1 MMOL/L (ref 3.5–5.2)
PROT SERPL-MCNC: 7.7 G/DL (ref 6–8.5)
RBC # BLD AUTO: 4.28 10*6/MM3 (ref 3.77–5.28)
SODIUM SERPL-SCNC: 140 MMOL/L (ref 136–145)
URATE SERPL-MCNC: 4.9 MG/DL (ref 2.4–5.7)
WBC NRBC COR # BLD: 3.16 10*3/MM3 (ref 3.4–10.8)

## 2021-12-03 RX ORDER — HYDROCODONE BITARTRATE AND ACETAMINOPHEN 7.5; 325 MG/1; MG/1
1 TABLET ORAL EVERY 6 HOURS PRN
OUTPATIENT
Start: 2021-12-03

## 2021-12-03 RX ORDER — HYDROCODONE BITARTRATE AND ACETAMINOPHEN 7.5; 325 MG/1; MG/1
1 TABLET ORAL 2 TIMES DAILY PRN
Qty: 28 TABLET | Refills: 0 | Status: SHIPPED | OUTPATIENT
Start: 2021-12-03 | End: 2021-12-23 | Stop reason: SDUPTHER

## 2021-12-03 NOTE — TELEPHONE ENCOUNTER
Caller: Liliya Hook    Relationship: Self    Best call back number: [phone]  What medication are you requesting:   PAIN MEDICATION    What are your current symptoms:   PAIN IN ANKLES,KNEES,ELBOWS AND HAND    How long have you been experiencing symptoms:   2-3 WEEKS    Have you had these symptoms before:    [x] Yes  [] No    Have you been treated for these symptoms before:   [x] Yes  [] No    If a prescription is needed, what is your preferred pharmacy and phone number: [id number] [address] AT [facility] & [facility] - [phone] [facility] [phone] FX     Additional notes:    PT STATED  THAT SHE WAS PRESCRIBED RX TRAMADOL FOR PAIN BUT RX IS NOT WORKING.

## 2021-12-06 DIAGNOSIS — R76.8 POSITIVE ANA (ANTINUCLEAR ANTIBODY): Primary | ICD-10-CM

## 2021-12-10 DIAGNOSIS — M25.50 GENERALIZED JOINT PAIN: ICD-10-CM

## 2021-12-10 DIAGNOSIS — M35.9 AUTOIMMUNE DISEASE (HCC): Primary | ICD-10-CM

## 2021-12-10 RX ORDER — PREDNISONE 10 MG/1
TABLET ORAL
Qty: 42 TABLET | Refills: 0 | Status: SHIPPED | OUTPATIENT
Start: 2021-12-10 | End: 2021-12-30 | Stop reason: SDUPTHER

## 2021-12-15 ENCOUNTER — TELEPHONE (OUTPATIENT)
Dept: INTERNAL MEDICINE | Facility: CLINIC | Age: 26
End: 2021-12-15

## 2021-12-23 ENCOUNTER — OFFICE VISIT (OUTPATIENT)
Dept: INTERNAL MEDICINE | Facility: CLINIC | Age: 26
End: 2021-12-23

## 2021-12-23 VITALS
BODY MASS INDEX: 30.32 KG/M2 | RESPIRATION RATE: 16 BRPM | DIASTOLIC BLOOD PRESSURE: 76 MMHG | SYSTOLIC BLOOD PRESSURE: 118 MMHG | HEART RATE: 94 BPM | OXYGEN SATURATION: 98 % | HEIGHT: 65 IN | WEIGHT: 182 LBS | TEMPERATURE: 96.4 F

## 2021-12-23 DIAGNOSIS — M35.9 AUTOIMMUNE DISEASE: Primary | ICD-10-CM

## 2021-12-23 DIAGNOSIS — I49.9 CARDIAC ARRHYTHMIA, UNSPECIFIED CARDIAC ARRHYTHMIA TYPE: ICD-10-CM

## 2021-12-23 DIAGNOSIS — N92.0 MENORRHAGIA WITH REGULAR CYCLE: ICD-10-CM

## 2021-12-23 DIAGNOSIS — Z79.01 CHRONIC ANTICOAGULATION: ICD-10-CM

## 2021-12-23 DIAGNOSIS — M25.50 GENERALIZED JOINT PAIN: ICD-10-CM

## 2021-12-23 DIAGNOSIS — Z86.711 HISTORY OF PULMONARY EMBOLUS (PE): ICD-10-CM

## 2021-12-23 PROCEDURE — 99214 OFFICE O/P EST MOD 30 MIN: CPT | Performed by: NURSE PRACTITIONER

## 2021-12-23 RX ORDER — APIXABAN 5 MG/1
5 TABLET, FILM COATED ORAL 2 TIMES DAILY
COMMUNITY
Start: 2021-11-24 | End: 2022-04-21

## 2021-12-23 RX ORDER — HYDROCODONE BITARTRATE AND ACETAMINOPHEN 7.5; 325 MG/1; MG/1
1 TABLET ORAL 2 TIMES DAILY PRN
Qty: 28 TABLET | Refills: 0 | Status: SHIPPED | OUTPATIENT
Start: 2021-12-23 | End: 2022-01-07 | Stop reason: SDUPTHER

## 2021-12-23 RX ORDER — FERROUS SULFATE 325(65) MG
325 TABLET ORAL
COMMUNITY
End: 2022-04-07

## 2021-12-23 RX ORDER — CYANOCOBALAMIN (VITAMIN B-12) 1000 MCG
2 TABLET ORAL DAILY
Qty: 60 TABLET | Refills: 2 | Status: SHIPPED | OUTPATIENT
Start: 2021-12-23 | End: 2022-04-07

## 2021-12-30 ENCOUNTER — TELEPHONE (OUTPATIENT)
Dept: INTERNAL MEDICINE | Facility: CLINIC | Age: 26
End: 2021-12-30

## 2021-12-30 DIAGNOSIS — M25.50 GENERALIZED JOINT PAIN: ICD-10-CM

## 2021-12-30 DIAGNOSIS — M35.9 AUTOIMMUNE DISEASE (HCC): ICD-10-CM

## 2021-12-30 RX ORDER — PREDNISONE 10 MG/1
TABLET ORAL
Qty: 42 TABLET | Refills: 0 | Status: SHIPPED | OUTPATIENT
Start: 2021-12-30

## 2021-12-30 NOTE — TELEPHONE ENCOUNTER
Patient contacted office about status of rheumatology appt. Reports she is having severe pain. Have we heard back from them yet on her appt?

## 2022-01-07 DIAGNOSIS — M35.9 AUTOIMMUNE DISEASE: ICD-10-CM

## 2022-01-07 DIAGNOSIS — M25.50 GENERALIZED JOINT PAIN: ICD-10-CM

## 2022-01-07 RX ORDER — HYDROCODONE BITARTRATE AND ACETAMINOPHEN 7.5; 325 MG/1; MG/1
1 TABLET ORAL DAILY PRN
Qty: 28 TABLET | Refills: 0 | Status: SHIPPED | OUTPATIENT
Start: 2022-01-07 | End: 2022-02-21 | Stop reason: SDUPTHER

## 2022-01-07 RX ORDER — MELOXICAM 7.5 MG/1
7.5 TABLET ORAL DAILY
Qty: 30 TABLET | Refills: 1 | Status: SHIPPED | OUTPATIENT
Start: 2022-01-07 | End: 2022-04-07

## 2022-01-07 RX ORDER — METHOCARBAMOL 750 MG/1
TABLET, FILM COATED ORAL
Qty: 90 TABLET | Refills: 2 | Status: SHIPPED | OUTPATIENT
Start: 2022-01-07 | End: 2022-04-07

## 2022-01-09 ENCOUNTER — TELEPHONE (OUTPATIENT)
Dept: INTERNAL MEDICINE | Facility: CLINIC | Age: 27
End: 2022-01-09

## 2022-01-10 ENCOUNTER — TELEPHONE (OUTPATIENT)
Dept: INTERNAL MEDICINE | Facility: CLINIC | Age: 27
End: 2022-01-10

## 2022-01-10 NOTE — TELEPHONE ENCOUNTER
Patient was seen at [facility] ER on 12/1 with abnormal EKG and referred to cardiology. Please try and get this ER note and EKG.

## 2022-01-10 NOTE — TELEPHONE ENCOUNTER
Patient went to ER over the weekend and was admitted to [facility]. 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 She has upcoming appts with [facility] neurology and cardiology. They suggested that she cancel these and follow up with rheumatology outpatient in about 3 weeks. Discussed this with patient. Suggested she keep appts with cardiology and neurology until we have more info on her condition. Trying to get cardiology records from Dec stay at [facility]. Will request [facility] records from this stay.

## 2022-01-12 PROBLEM — R07.9 CHEST PAIN: Status: ACTIVE | Noted: 2022-01-12

## 2022-01-12 PROBLEM — Z72.0 TOBACCO ABUSE: Status: ACTIVE | Noted: 2022-01-12

## 2022-02-21 DIAGNOSIS — M35.9 AUTOIMMUNE DISEASE: ICD-10-CM

## 2022-02-21 DIAGNOSIS — M25.50 GENERALIZED JOINT PAIN: ICD-10-CM

## 2022-02-21 RX ORDER — HYDROCODONE BITARTRATE AND ACETAMINOPHEN 7.5; 325 MG/1; MG/1
1 TABLET ORAL DAILY PRN
Qty: 28 TABLET | Refills: 0 | Status: SHIPPED | OUTPATIENT
Start: 2022-02-21 | End: 2022-04-07

## 2022-02-21 NOTE — TELEPHONE ENCOUNTER
Caller: Liliya Hook    Relationship: Self    Best call back number: [phone]    What is the best time to reach you: ANYTIME    Who are you requesting to speak with (clinical staff, provider,  specific staff member): PROVIDER    What was the call regarding: HAS A FEW QUESTIONS. WILL DISCUSS    Do you require a callback: YES

## 2022-03-10 DIAGNOSIS — M25.50 GENERALIZED JOINT PAIN: ICD-10-CM

## 2022-03-10 DIAGNOSIS — M35.9 AUTOIMMUNE DISEASE: ICD-10-CM

## 2022-03-10 RX ORDER — HYDROCODONE BITARTRATE AND ACETAMINOPHEN 7.5; 325 MG/1; MG/1
1 TABLET ORAL DAILY PRN
Qty: 28 TABLET | Refills: 0 | OUTPATIENT
Start: 2022-03-10

## 2022-03-10 NOTE — TELEPHONE ENCOUNTER
Caller: Liliya Hook    Relationship: Self    Best call back number:  [phone]     Requested Prescriptions:   Requested Prescriptions     Pending Prescriptions Disp Refills   • HYDROcodone-acetaminophen (NORCO) 7.5-325 MG per tablet 28 tablet 0     Sig: Take 1 tablet by mouth Daily As Needed for Moderate Pain  (AS NEEDED).        Pharmacy where request should be sent: [facility] #[id number] - [address] AT [facility] & [employer] - [phone] [facility] [phone] FX     Additional details provided by patient:  PATIENT HAS 2 TABLETS LEFT OF MEDICATION     Does the patient have less than a 3 day supply:  [x] Yes  [] No

## 2022-03-17 ENCOUNTER — TELEPHONE (OUTPATIENT)
Dept: ONCOLOGY | Facility: CLINIC | Age: 27
End: 2022-03-17

## 2022-03-17 NOTE — TELEPHONE ENCOUNTER
Caller: Liliya Hook    Relationship to patient: Self    Best call back number: [phone]    Chief complaint: PATIENT CALLING TO RESCHEDULE HER NEW PT APPT SHE MISSED ON 2-9-22, UNABLE TO SCHEDULE WITHIN HUB TIME FRAME, PLEASE ADVISE?    Type of visit: NEW PT APPT    Requested date: PREFERS LATER MORNINGS AND NOT ON FRI'S    If rescheduling, when is the original appointment: 2-9-22

## 2022-04-06 NOTE — PROGRESS NOTES
OFFICE VISIT  NOTE  [facility] CARDIOLOGY      Name: Liliya Hook    Date: 2022  MRN:  [id number]  :  1995      REFERRING/PRIMARY PROVIDER:  Karlie Aguilar APRN    Chief Complaint   Patient presents with   • Irregular Heart Beat   • Pulmonary Embolism       HPI: Liliya Hook is a 26 y.o. female who presents today for new consultation for chest pain and cardiac arrhythmia.  History of PE diagnosed at [facility] in 2022, unprovoked, seeing hematology she states she had an abnormal clotting disorder they are repeating.  She also has lupus and kidney involvement she is seeing rheumatologist and nephrologist, switching her care to [facility].  She does have palpitations and episode yesterday lasted 30 minutes of racing heartbeat.  She is on 60 mg of prednisone for least the next month.  She denies chest pain.  She does have easy shortness of breath.  She had 2 echocardiograms in [place], report pending.    Past Medical History:   Diagnosis Date   • Anemia    • Anxiety    • Blood transfusion without reported diagnosis    • Cluster headache    • Depression    • Dizziness    • Fainting    • Kidney disorder    • Migraine headache    • Numbness and tingling    • Panic attacks    • Pulmonary embolism (HCC)    • Tension headache    • Weakness        Past Surgical History:   Procedure Laterality Date   • RENAL BIOPSY     • TUBAL ABDOMINAL LIGATION         Social History     Socioeconomic History   • Marital status:    Tobacco Use   • Smoking status: Former Smoker     Types: Cigarettes     Quit date: 2019     Years since quitting: 3.2   • Smokeless tobacco: Never Used   Vaping Use   • Vaping Use: Never used   Substance and Sexual Activity   • Alcohol use: No   • Drug use: No   • Sexual activity: Defer       Family History   Problem Relation Age of Onset   • No Known Problems Mother    • Hypertension Father    • Diabetes Maternal Grandmother    • Arthritis  "Maternal Grandmother         ROS:   Constitutional no fever,  no weight loss   Skin no rash, no subcutaneous nodules   Otolaryngeal no difficulty swallowing   Cardiovascular See HPI   Pulmonary no cough, no sputum production   Gastrointestinal no constipation, no diarrhea   Genitourinary no dysuria, no hematuria   Hematologic no easy bruisability, no abnormal bleeding   Musculoskeletal no muscle pain   Neurologic no dizziness, no falls         No Known Allergies      Current Outpatient Medications:   •  Eliquis 5 MG tablet tablet, Take 5 mg by mouth 2 (Two) Times a Day., Disp: , Rfl:   •  hydroxychloroquine (PLAQUENIL) 200 MG tablet, Take  by mouth Daily., Disp: , Rfl:   •  mycophenolate (CELLCEPT) 500 MG tablet, 2 (Two) Times a Day., Disp: , Rfl:   •  pantoprazole (PROTONIX) 40 MG EC tablet, Daily., Disp: , Rfl:   •  predniSONE (DELTASONE) 10 MG (48) dose pack, 12 day taper pack. Take as directed on package., Disp: 42 tablet, Rfl: 0  •  sulfamethoxazole-trimethoprim (BACTRIM,SEPTRA) 400-80 MG tablet, Daily., Disp: , Rfl:     Vitals:    04/07/22 1030   BP: 126/92   BP Location: Right arm   Patient Position: Sitting   Pulse: 80   SpO2: 98%   Weight: 89.5 kg (197 lb 6.4 oz)   Height: 165.1 cm (65\")     Body mass index is 32.85 kg/m².    PHYSICAL EXAM:    General Appearance:   · well developed  · well nourished  HENT:   · oropharynx moist  · lips not cyanotic  Neck:  · thyroid not enlarged  · supple  Respiratory:  · no respiratory distress  · normal breath sounds  · no rales  Cardiovascular:  · no jugular venous distention  · regular rhythm  · apical impulse normal  · S1 normal, S2 normal  · no S3, no S4   · no murmur  · no rub, no thrill  · carotid pulses normal; no bruit  · lower extremity edema: none      Musculoskeletal:  · no clubbing of fingers.   · normocephalic, head atraumatic  Skin:   · warm, dry  Psychiatric:  · judgement and insight appropriate  · normal mood and affect    RESULTS:     ECG 12 " Lead    Date/Time: 4/7/2022 10:49 AM  Performed by: Rogelio Bethea MD  Authorized by: Rogelio Bethea MD   Comparison: not compared with previous ECG   Previous ECG: no previous ECG available  Rhythm: sinus rhythm  Rate: normal  BPM: 80  QRS axis: normal    Clinical impression: normal ECG                  Labs:  Lab Results   Component Value Date    AST 39 (H) 01/21/2022    ALT 29 01/21/2022     No results found for: HGBA1C  No components found for: CREATINININE  eGFR Non  Am   Date Value Ref Range Status   03/02/2022 >60 >60 mL/min/1.73m*2 Final     Comment:     eGFR = estimated GFR; eGFR units = mL/min/1.73 sq meters Chronic Kidney Disease is considered if eGFR <60 mL/min/1.73 sq meters Kidney failure is considered if eGFR is <15 mL/min/1.73 sq meters. eGFR assumes steady state plasma creatinine concentration; not applicable if renal function is rapidly changing or patient is on dialysis.   01/21/2022 >60 >60 mL/min/1.73m*2 Final     Comment:     eGFR = estimated GFR; eGFR units = mL/min/1.73 sq meters Chronic Kidney Disease is considered if eGFR <60 mL/min/1.73 sq meters Kidney failure is considered if eGFR is <15 mL/min/1.73 sq meters. eGFR assumes steady state plasma creatinine concentration; not applicable if renal function is rapidly changing or patient is on dialysis.   01/10/2022 >60 >60 mL/min/1.73m*2 Final     Comment:     eGFR = estimated GFR; eGFR units = mL/min/1.73 sq meters Chronic Kidney Disease is considered if eGFR <60 mL/min/1.73 sq meters Kidney failure is considered if eGFR is <15 mL/min/1.73 sq meters. eGFR assumes steady state plasma creatinine concentration; not applicable if renal function is rapidly changing or patient is on dialysis.       Most recent PCP note, imaging tests, and labs reviewed.    ASSESSMENT:  Problem List Items Addressed This Visit        Cardiac and Vasculature    Chest pain - Primary    Overview     10/1/21 CT chest positive for PE left lower lobar  pulmonary artery               Tobacco    Tobacco abuse          PLAN:    1.  Palpitations:  48-hour Holter monitor placed today  Increase hydration  Likely exacerbated by prednisone    2.  Unprovoked PE:  Agree with Eliquis for now, defer to hematology regarding long-term anticoagulation.    3. Tobacco abuse:  Discussed importance of complete tobacco cessation    4.  Lupus nephritis:  Follow-up with nephrology, complicates all aspects of care.      Return to clinic in 12 months, or sooner as needed.    Thank you for the opportunity to share in the care of your patient; please do not hesitate to call me with any questions.     Rogelio Bethea MD, [facility]  Office: [phone] [address] [place] [address]    04/07/22

## 2022-04-07 ENCOUNTER — OFFICE VISIT (OUTPATIENT)
Dept: CARDIOLOGY | Facility: CLINIC | Age: 27
End: 2022-04-07

## 2022-04-07 VITALS
WEIGHT: 197.4 LBS | HEART RATE: 80 BPM | DIASTOLIC BLOOD PRESSURE: 92 MMHG | BODY MASS INDEX: 32.89 KG/M2 | OXYGEN SATURATION: 98 % | SYSTOLIC BLOOD PRESSURE: 126 MMHG | HEIGHT: 65 IN

## 2022-04-07 DIAGNOSIS — R07.9 CHEST PAIN, UNSPECIFIED TYPE: Primary | ICD-10-CM

## 2022-04-07 DIAGNOSIS — I49.9 IRREGULAR HEART BEAT: ICD-10-CM

## 2022-04-07 DIAGNOSIS — Z72.0 TOBACCO ABUSE: ICD-10-CM

## 2022-04-07 DIAGNOSIS — R00.2 PALPITATIONS: ICD-10-CM

## 2022-04-07 PROCEDURE — 99204 OFFICE O/P NEW MOD 45 MIN: CPT | Performed by: INTERNAL MEDICINE

## 2022-04-07 PROCEDURE — 93000 ELECTROCARDIOGRAM COMPLETE: CPT | Performed by: INTERNAL MEDICINE

## 2022-04-07 RX ORDER — MYCOPHENOLATE MOFETIL 500 MG/1
TABLET ORAL 2 TIMES DAILY
COMMUNITY
Start: 2022-03-16

## 2022-04-07 RX ORDER — HYDROXYCHLOROQUINE SULFATE 200 MG/1
TABLET, FILM COATED ORAL DAILY
COMMUNITY

## 2022-04-07 RX ORDER — PANTOPRAZOLE SODIUM 40 MG/1
TABLET, DELAYED RELEASE ORAL DAILY
COMMUNITY
Start: 2022-03-16

## 2022-04-07 RX ORDER — SULFAMETHOXAZOLE AND TRIMETHOPRIM 400; 80 MG/1; MG/1
TABLET ORAL DAILY
COMMUNITY
Start: 2022-03-17

## 2022-04-13 ENCOUNTER — CONSULT (OUTPATIENT)
Dept: ONCOLOGY | Facility: CLINIC | Age: 27
End: 2022-04-13

## 2022-04-13 ENCOUNTER — TELEPHONE (OUTPATIENT)
Dept: PHARMACY | Facility: HOSPITAL | Age: 27
End: 2022-04-13

## 2022-04-13 VITALS
HEART RATE: 115 BPM | HEIGHT: 65 IN | SYSTOLIC BLOOD PRESSURE: 146 MMHG | WEIGHT: 196.6 LBS | OXYGEN SATURATION: 99 % | TEMPERATURE: 97.7 F | DIASTOLIC BLOOD PRESSURE: 97 MMHG | BODY MASS INDEX: 32.76 KG/M2 | RESPIRATION RATE: 16 BRPM

## 2022-04-13 DIAGNOSIS — I26.99 PULMONARY EMBOLISM, UNSPECIFIED CHRONICITY, UNSPECIFIED PULMONARY EMBOLISM TYPE, UNSPECIFIED WHETHER ACUTE COR PULMONALE PRESENT: Primary | ICD-10-CM

## 2022-04-13 DIAGNOSIS — D68.61 ANTICARDIOLIPIN ANTIBODY SYNDROME: Primary | ICD-10-CM

## 2022-04-13 PROCEDURE — 99204 OFFICE O/P NEW MOD 45 MIN: CPT | Performed by: INTERNAL MEDICINE

## 2022-04-13 RX ORDER — FUROSEMIDE 40 MG/1
40 TABLET ORAL 2 TIMES DAILY
COMMUNITY

## 2022-04-13 RX ORDER — WARFARIN SODIUM 2.5 MG/1
TABLET ORAL
Qty: 60 TABLET | Refills: 0 | Status: SHIPPED | OUTPATIENT
Start: 2022-04-13 | End: 2022-05-16 | Stop reason: SDUPTHER

## 2022-04-13 NOTE — TELEPHONE ENCOUNTER
Spoke with patient re: new referral  Indication: PE (10/2021), Lupus  Goal INR: 2.5-3.5  Referring provider: Dr South  Comments: Patient previously on Eliquis  **Of note patient taking Bactrim 400/80mg daily, due to DDI may warrant more frequent INR draws intially    Introduced clinic and verified patient dosing information. Will start patient on warfarin 5 mg daily, and she will need to be bridged to therapeutic INR per Dr South with Lovenox.   3/2/22 Scr: 0.56mg/dL, crcl > 60ml/min    Weight: 89 kg  Warfarin (Jantoven) 2.5mg tablets #60 and Lovenox 90mg syringes #10 sent to patients pharmacy.    Last dose of Eliquis was this morning.  She will take first Lovenox injection tonight and start warfarin 5mg daily tomorrow.    Mrs Hook is agreeable to  come to  Clinic for formal education and initial INR draw on 4/18.  Explained that due to her indication she would have to get a venipuncture lab draw, every time.  She states she would prefer to have labs drawn in [place], either at [facility] or  with her doctor appointments.    Patient has written down our contact information should they have any questions.   Patient has no further questions at this time.

## 2022-04-13 NOTE — PROGRESS NOTES
ID: 26 y.o. year old female from [place] [address]    PCP: Karlie Aguilar APRN    REFERRING PHYSICIAN: KANE Huggins    Reason for Consultation: Antiphospholipid syndrome with history of PE    Dear Ms. Alexandre, Dr. Bean and Dr. Queen ( Dr. Queen not listed in Epic, so I will try to send note to [facility] practitioner if possible)    It is a pleasure to meet Ms. Hook today.  She is a very pleasant 26-year-old lady who presents today for consultation for history of pulmonary embolism in the setting of antiphospholipid syndrome.  She also has underlying lupus with lupus nephritis diagnosed by biopsy.  She is currently on mycophenolate and Plaquenil along with prednisone at 60 mg/day for immunosuppression.  She is taking Eliquis for her pulmonary embolism.  She reports continued shortness of breath.  She has pleuritic chest pain that is ongoing also.  She otherwise seems to be in reasonable health with no significant issues as of recently.  She has had no miscarriages in the past.  She has 3 young children under the age of 7.  She denies any significant rashes.        Past Medical History:   Diagnosis Date   • Anemia    • Anxiety    • Blood transfusion without reported diagnosis    • Cluster headache    • Depression    • Dizziness    • Fainting    • Kidney disorder    • Migraine headache    • Numbness and tingling    • Panic attacks    • Pulmonary embolism (HCC)    • Tension headache    • Weakness        Past Surgical History:   Procedure Laterality Date   • RENAL BIOPSY     • TUBAL ABDOMINAL LIGATION         Social History     Socioeconomic History   • Marital status:    Tobacco Use   • Smoking status: Former Smoker     Types: Cigarettes     Quit date: 2019     Years since quitting: 3.2   • Smokeless tobacco: Never Used   Vaping Use   • Vaping Use: Never used   Substance and Sexual Activity   • Alcohol use: No   • Drug use: No   • Sexual activity: Defer       Family History   Problem  Relation Age of Onset   • No Known Problems Mother    • Hypertension Father    • Diabetes Maternal Grandmother    • Arthritis Maternal Grandmother        Review of Systems:    16 point review of systems was performed and reviewed and scanned into the EMR    Review of Systems - Oncology      Current Outpatient Medications:   •  Eliquis 5 MG tablet tablet, Take 5 mg by mouth 2 (Two) Times a Day., Disp: , Rfl:   •  furosemide (LASIX) 40 MG tablet, Take 40 mg by mouth 2 (Two) Times a Day., Disp: , Rfl:   •  hydroxychloroquine (PLAQUENIL) 200 MG tablet, Take  by mouth Daily., Disp: , Rfl:   •  mycophenolate (CELLCEPT) 500 MG tablet, 2 (Two) Times a Day., Disp: , Rfl:   •  pantoprazole (PROTONIX) 40 MG EC tablet, Daily., Disp: , Rfl:   •  predniSONE (DELTASONE) 10 MG (48) dose pack, 12 day taper pack. Take as directed on package., Disp: 42 tablet, Rfl: 0  •  sulfamethoxazole-trimethoprim (BACTRIM,SEPTRA) 400-80 MG tablet, Daily., Disp: , Rfl:   •  enoxaparin (LOVENOX) 100 MG/ML solution syringe, Inject 0.9 mL under the skin into the appropriate area as directed Every 12 (Twelve) Hours., Disp: 10 mL, Rfl: 0  •  warfarin (Jantoven) 2.5 MG tablet, Take 2 tablets (5mg) by mouth daily or as directed by anticoagulation clinic., Disp: 60 tablet, Rfl: 0    Pain Medications             predniSONE (DELTASONE) 10 MG (48) dose pack 12 day taper pack. Take as directed on package.           No Known Allergies      ECOG score: 0           Objective     Vitals:    04/13/22 1117   BP: 146/97   Pulse: 115   Resp: 16   Temp: 97.7 °F (36.5 °C)   SpO2: 99%     Body mass index is 32.72 kg/m².  Body surface area is 1.96 meters squared.        04/13/22 1117   Weight: 89.2 kg (196 lb 9.6 oz)     Pain Score    04/13/22 1117   PainSc:   4   PainLoc: Hand  Comment: Bilateral          Physical Exam    General: well appearing, in no acute distress  HEENT: sclera anicteric, oropharynx clear, neck is supple  Lymphatics: no cervical, supraclavicular, or  axillary adenopathy  Cardiovascular: regular rate and rhythm, no murmurs, rubs or gallops  Lungs: clear to auscultation bilaterally  Abdomen: soft, nontender, nondistended.  No palpable organomegaly  Extremities: no lower extremity edema  Skin: no rashes, lesions, bruising, or petechiae  Msk:  Shows no weakness of the large muscle groups  Psych: Mood is stable        Lab Results   Component Value Date    GLUCOSE 138 (H) 01/07/2022    BUN 11 03/02/2022    CREATININE 0.56 (L) 03/02/2022     03/02/2022    K 4.2 03/02/2022     03/02/2022    CO2 26 03/02/2022    CALCIUM 9.5 03/02/2022    PROTEINTOT 7.0 01/21/2022    ALBUMIN 4.0 03/02/2022    BILITOT 0.4 01/21/2022    ALKPHOS 66 01/21/2022    AST 39 (H) 01/21/2022    ALT 29 01/21/2022       Lab Results   Component Value Date    HGB 11.5 03/15/2022    HCT 34.6 03/15/2022    MCV 86 03/15/2022     (L) 03/15/2022    WBC 5.22 03/15/2022    NEUTROABS 4.09 01/21/2022    LYMPHSABS 0.52 (L) 01/21/2022    MONOSABS 0.25 (L) 01/21/2022    EOSABS 0.00 01/21/2022    BASOSABS 0.01 01/21/2022         Beta-2 Glyco 1 IgG   <20.0 U/mL >112.0 High     Beta-2 Glyco 1 IgG   Negative Positive Abnormal     Beta-2 Glyco 1 IgM   <20.0 U/mL 79.7 High     Beta-2 Glyco 1 IgM   Negative Positive Abnormal     Resulting Agency [facility]   Specimen Collected: 01/10/22 04:03         Anticardiolipin IgG   <20.00 GPL Units/mL >102.00 High     Anticardiolipin IgG   Negative Positive Abnormal     Anticardiolipin IgM   <20.00 MPL Units/mL 78.90 High     Anticardiolipin IgM   Negative Positive Abnormal          US Renal Complete    Result Date: 3/15/2022  No hydronephrosis. Diffusely echogenic bilateral renal medullary pyramids suspicious for medullary nephrocalcinosis. CRITICAL RESULT:   No. COMMUNICATION: Per this written report. Dictated by Harinder Marinelli on 3/15/2022 10:49 AM Signed by Harinder Marinelli on 3/15/2022 10:53 AM    US Guided Biopsy Renal Left    Result Date:  3/15/2022  Ultrasound guidance provided for left native kidney biopsy. CRITICAL RESULT: No. COMMUNICATION: Per this written report. Dictated by Harinder Marinelli on 3/15/2022 10:54 AM Signed by Harinder Marinelli on 3/15/2022 10:55 AM    US Abdomen RUQ    Result Date: 1/8/2022  Echogenic liver consistent with parenchymal disease such as fatty infiltration CRITICAL RESULT: No. COMMUNICATION: Per this written report. Signed by Romulo Rivers on  1/8/2022 8:07 AM        Assessment/Plan      1.  Pulmonary embolism in the setting of antiphospholipid syndrome.  I am going to get a CTA of her chest to see if she is having recurrent clots.  She should have been fairly asymptomatic by now.  But she is still considerably symptomatic with pleuritic pain.  However I think we need to change her anticoagulation.  The data with anti Xa drugs suggest that they are inferior in the setting of high risk patients with APL.  This patient has fairly high titers which would be considered significant risk .  I recommended she be switched to warfarin.  Initially at least I would prefer her INR to be between 2.5 and 3.5 just to limit her risk of having a breakthrough clot in the setting of still having symptomatic chest discomfort.  I also recommended she have a medical alert bracelet due to being on blood thinners.  She is going to be on it indefinitely.  Usually we do not see titers this high resolve with time.  She may benefit from therapy directed to reduce her antibody load.  Hopefully the mycophenolate with Plaquenil and prednisone will do some of it.  If it does not, then rituximab may be of choice going forward.    2.  Lupus nephritis.  She is being managed by nephrology at .  She also sees Dr. Glendy Queen at rheumatology at .        Thank you for allowing me to participate in the care of this patient.    Yours sincerely,    Bindu Hayden MD  [facility]  Hematology and Oncology    Return in (Approximately): 3  months    Orders Placed This Encounter   Procedures   • CT Angiogram Chest     Order Specific Question:   Patient Pregnant     Answer:   No

## 2022-04-19 ENCOUNTER — TELEPHONE (OUTPATIENT)
Dept: PHARMACY | Facility: HOSPITAL | Age: 27
End: 2022-04-19

## 2022-04-20 ENCOUNTER — ANTICOAGULATION VISIT (OUTPATIENT)
Dept: PHARMACY | Facility: HOSPITAL | Age: 27
End: 2022-04-20

## 2022-04-20 ENCOUNTER — LAB (OUTPATIENT)
Dept: LAB | Facility: HOSPITAL | Age: 27
End: 2022-04-20

## 2022-04-20 DIAGNOSIS — I26.99 PULMONARY EMBOLISM, UNSPECIFIED CHRONICITY, UNSPECIFIED PULMONARY EMBOLISM TYPE, UNSPECIFIED WHETHER ACUTE COR PULMONALE PRESENT: ICD-10-CM

## 2022-04-20 DIAGNOSIS — I26.99 PULMONARY EMBOLISM, UNSPECIFIED CHRONICITY, UNSPECIFIED PULMONARY EMBOLISM TYPE, UNSPECIFIED WHETHER ACUTE COR PULMONALE PRESENT: Primary | ICD-10-CM

## 2022-04-20 LAB
INR PPP: 1.75 (ref 0.84–1.13)
PROTHROMBIN TIME: 20.4 SECONDS (ref 11.4–14.4)

## 2022-04-20 PROCEDURE — 36415 COLL VENOUS BLD VENIPUNCTURE: CPT

## 2022-04-20 PROCEDURE — 85610 PROTHROMBIN TIME: CPT

## 2022-04-21 NOTE — PROGRESS NOTES
"Anticoagulation Clinic - Remote Progress Note  REMOTE LAB    Indication: PE in setting of antiphospholipid syndrome, 1/2022, 10/2021  Referring Provider:  Dr. Bindu Hayden MD  Initial Warfarin Start Date: 4/14/22  Goal INR: 2.5-3.5  Current Drug Interactions: prednisone, pantoprazole  Other: previously on Eliquis; Lupus    4/13 Hem/Onc - recommended switching to warfarin due to \"data with anti Xa drugs suggest that they are inferior in the setting of high risk patients with APL. This patient has fairly high titers which would be considered significant risk.\"  Birth Control Plan if applicable: tubal ligation- - hx of stroke-like sx that began with her pregnancies (facial numbness)    Diet: green beans, broccoli, asparagus (2-3x weekly)  Alcohol: socially   Tobacco: cigarettes 4-5 daily + vape but trying to cut back. Aware to keep clinic informed  OTC Pain Medication:    INR History:  Date 4/20         Total Weekly Dose 15mg         INR 1.75         Notes bactrim; lovenox D/c bactrim decr pred        *Take dose in the morning*    Phone Interview:  Tablet Strength: 2.5 mg Roberttoven  Patient Contact Info: [phone]  Estimated OOP cost: none  Verbal Release Auth: NEED   Lab Contact Info: Juan Hall    Patient Findings    Positives:  Change in medications   Negatives:  Signs/symptoms of thrombosis, Signs/symptoms of bleeding, Laboratory test error suspected, Change in health, Change in alcohol use, Change in activity, Upcoming invasive procedure, Emergency department visit, Upcoming dental procedure, Missed doses, Extra doses, Change in diet/appetite, Hospital admission, Bruising, Other complaints   Comments:  Has only been using lovenox q24h -- will switch to q12h. Counselled on importance of BID dosing.  She stopped bactrim yesterday-- she plans to confirm with her nephrologist who prescribed the medication why she was taking and if she needs to continue or if she can switch to another abx. " "(Reports her RN told her it was \"preventative\" and she is ok to d/c for now and can discuss at her apt with their office next week)  Currently on long term pred taper-- was on 60mg qd x30day finished yesterday and today will begin 40mg x30d         Liliya Hook is a new start to warfarin therapy. She was by herself for counseling provided over the phone. Discussed warfarin's indication, mechanism, and dosing. Also enforced the importance of taking warfarin as instructed and at the same time every day, preferably in the evening so that we can make dose adjustments more easily following subsequent clinic visits. She expressed understanding of this fact. We also discussed what she should do about a missed dose; pts can take missed doses within about 12 hours of their usual scheduled dose, but she was instructed on the importance of not doubling up on doses unless told to do so by the warfarin clinic. Explained possible side effects of warfarin therapy, including increased risk of bleeding, s/sx of bleeding and s/sx of any additional clots/PE/CVA. Also discussed monitoring of warfarin, the INR, goal INR range 2.0-3.0, and the frequency of monitoring. Explained that she would be coming into the clinic more frequently in these first few weeks of therapy as we try to adjust her dose and achieve a therapeutic INR x 2 consecutive readings. Once that is achieved, pt will follow up in clinic every 4 weeks, on average. She stated no problems with transportation or scheduling clinic appts in this manner. Reviewed drug/food/tobacco/EtOH interactions and provided written information covering these topics in more detail, explaining that green, leafy vegetables interact most heavily with warfarin. Instructed the pt not to take or discontinue any medications without informing her physician/pharmacist and reminded her to inform us of any dietary changes, as well. She expressed understanding of the information provided and has " no additional questions at this time.      Plan:  1. INR is SUBtherapeutic at 1.75 on 4/20. Considering medication changes and INR goal 2.5-3.5, Instructed pt to continue lovenox 1mg/kg q12h and begin alternating warfarin 5mg and 7.5mg daily. Refill of lovenox sent per patient request  2. Repeat INR 4/25 at [facility]  3. Pt declines warfarin refills.  4. Verbal information provided over the phone to Liliya Hook. Liliya Hook expresses understanding by teach back, RBV dosing instructions, and has no further questions at this time.     Karlee Merino, KasandraD.  04/21/22   12:57 EDT

## 2022-04-25 ENCOUNTER — TELEPHONE (OUTPATIENT)
Dept: CARDIOLOGY | Facility: CLINIC | Age: 27
End: 2022-04-25

## 2022-04-25 NOTE — TELEPHONE ENCOUNTER
----- Message from Rogelio Bethea MD sent at 4/25/2022  1:40 PM EDT -----  Please inform the patient of their test results. Thank you.

## 2022-04-26 ENCOUNTER — TELEPHONE (OUTPATIENT)
Dept: PHARMACY | Facility: HOSPITAL | Age: 27
End: 2022-04-26

## 2022-04-26 NOTE — TELEPHONE ENCOUNTER
LVM re: overdue INR - patient was due for INR yesterday (4/25) have not received    Last INR sub therapeutic and patient is on Lovenox

## 2022-04-28 NOTE — TELEPHONE ENCOUNTER
Spoke with patient re: overdue INR - patient was due for INR (4/25).  Last INR sub therapeutic and patient is on Lovenox. Reports there was a death in the family and she is currently without vehicle. No s/sx bleeding. Continues lovenox injections + warfarin 5mg daily, 7.5mg FriSun. Counseled on importance of checking INR, she plans to try to make it to lab tomorrow morning

## 2022-04-29 ENCOUNTER — LAB (OUTPATIENT)
Dept: LAB | Facility: HOSPITAL | Age: 27
End: 2022-04-29

## 2022-04-29 ENCOUNTER — HOSPITAL ENCOUNTER (OUTPATIENT)
Dept: CT IMAGING | Facility: HOSPITAL | Age: 27
Discharge: HOME | End: 2022-04-29

## 2022-04-29 ENCOUNTER — ANTICOAGULATION VISIT (OUTPATIENT)
Dept: PHARMACY | Facility: HOSPITAL | Age: 27
End: 2022-04-29

## 2022-04-29 DIAGNOSIS — I26.99 PULMONARY EMBOLISM, UNSPECIFIED CHRONICITY, UNSPECIFIED PULMONARY EMBOLISM TYPE, UNSPECIFIED WHETHER ACUTE COR PULMONALE PRESENT: ICD-10-CM

## 2022-04-29 DIAGNOSIS — I26.99 PULMONARY EMBOLISM, UNSPECIFIED CHRONICITY, UNSPECIFIED PULMONARY EMBOLISM TYPE, UNSPECIFIED WHETHER ACUTE COR PULMONALE PRESENT: Primary | ICD-10-CM

## 2022-04-29 LAB
INR PPP: 2.44 (ref 0.84–1.13)
PROTHROMBIN TIME: 26.6 SECONDS (ref 11.4–14.4)

## 2022-04-29 PROCEDURE — 85610 PROTHROMBIN TIME: CPT

## 2022-04-29 PROCEDURE — 0 IOPAMIDOL PER 1 ML: Performed by: INTERNAL MEDICINE

## 2022-04-29 PROCEDURE — 36415 COLL VENOUS BLD VENIPUNCTURE: CPT

## 2022-04-29 PROCEDURE — 71275 CT ANGIOGRAPHY CHEST: CPT

## 2022-04-29 RX ORDER — ATOVAQUONE 750 MG/5ML
1500 SUSPENSION ORAL DAILY
COMMUNITY
Start: 2022-04-27

## 2022-04-29 RX ADMIN — IOPAMIDOL 75 ML: 755 INJECTION, SOLUTION INTRAVENOUS at 10:47

## 2022-04-29 NOTE — PROGRESS NOTES
"Anticoagulation Clinic - Remote Progress Note  REMOTE LAB    Indication: PE in setting of antiphospholipid syndrome, 1/2022, 10/2021  Referring Provider:  Dr. Bindu Hayden MD  Initial Warfarin Start Date: 4/14/22  Goal INR: 2.5-3.5  Current Drug Interactions: prednisone, pantoprazole  Other: previously on Eliquis; Lupus    4/13 Hem/Onc - recommended switching to warfarin due to \"data with anti Xa drugs suggest that they are inferior in the setting of high risk patients with APL. This patient has fairly high titers which would be considered significant risk.\"  Birth Control Plan if applicable: tubal ligation- - hx of stroke-like sx that began with her pregnancies (facial numbness)    Diet: green beans, broccoli, asparagus (2-3x weekly)  Alcohol: socially   Tobacco: cigarettes 4-5 daily + vape but trying to cut back. Aware to keep clinic informed  OTC Pain Medication:    INR History:  Date 4/20 4/29        Total Weekly Dose 15mg 40 mg        INR 1.75 2.44        Notes bactrim; lovenox D/c bactrim decr pred        *Take dose in the morning, 4/29 now in evenings*    Phone Interview:  Tablet Strength: 2.5 mg Inderjit  Patient Contact Info: [phone]  Estimated OOP cost: none  Verbal Release Auth: NEED   Lab Contact Info: Juan Hall    Patient Findings  Positives:  Change in medications   Negatives:  Signs/symptoms of thrombosis, Signs/symptoms of bleeding, Laboratory test error suspected, Change in health, Change in alcohol use, Change in activity, Upcoming invasive procedure, Emergency department visit, Upcoming dental procedure, Missed doses, Extra doses, Change in diet/appetite, Hospital admission, Bruising, Other complaints   Comments:  Currently on long term pred taper--  begin prednisone 40mg x30d. Beginning on May 1, she will be taking prednisone 30 mg daily on 5/15 she will be taking prednisone 20 mg/day.   MPM increased from 4 a day to 6 a day.   She has stopped the bactrim and is now on " atovaquone.     She had a CT today.         Plan:  1. INR is SUBtherapeutic at 2.44 on 4/29 (INR goal 2.5-3.5), Instructed to take warfarin 7.5 mg today then 5 mg Sat, 7.5 mg on Sun, 5 mg on Monday until recheck she will stop lovenox.  2. Repeat INR 5/3 at [facility]  3. Pt declines warfarin refills.  4. Verbal information provided over the phone to Liliya Hook. Liliya Hook expresses understanding by teach back, RBV dosing instructions, and has no further questions at this time.     Ambrose Barrientos, PharmD  04/29/22   13:51 EDT

## 2022-05-03 ENCOUNTER — LAB (OUTPATIENT)
Dept: LAB | Facility: HOSPITAL | Age: 27
End: 2022-05-03

## 2022-05-03 DIAGNOSIS — I26.99 PULMONARY EMBOLISM, UNSPECIFIED CHRONICITY, UNSPECIFIED PULMONARY EMBOLISM TYPE, UNSPECIFIED WHETHER ACUTE COR PULMONALE PRESENT: ICD-10-CM

## 2022-05-03 LAB
INR PPP: 3.31 (ref 0.84–1.13)
PROTHROMBIN TIME: 33.9 SECONDS (ref 11.4–14.4)

## 2022-05-03 PROCEDURE — 36415 COLL VENOUS BLD VENIPUNCTURE: CPT

## 2022-05-03 PROCEDURE — 85610 PROTHROMBIN TIME: CPT

## 2022-05-04 ENCOUNTER — ANTICOAGULATION VISIT (OUTPATIENT)
Dept: PHARMACY | Facility: HOSPITAL | Age: 27
End: 2022-05-04

## 2022-05-04 DIAGNOSIS — I26.99 PULMONARY EMBOLISM, UNSPECIFIED CHRONICITY, UNSPECIFIED PULMONARY EMBOLISM TYPE, UNSPECIFIED WHETHER ACUTE COR PULMONALE PRESENT: Primary | ICD-10-CM

## 2022-05-04 NOTE — PROGRESS NOTES
"Anticoagulation Clinic - Remote Progress Note  REMOTE LAB    Indication: PE in setting of antiphospholipid syndrome, 1/2022, 10/2021  Referring Provider:  Dr. Bindu Hayden MD  Initial Warfarin Start Date: 4/14/22  Goal INR: 2.5-3.5  Current Drug Interactions: prednisone, pantoprazole  Other: previously on Eliquis; Lupus    4/13 Hem/Onc - recommended switching to warfarin due to \"data with anti Xa drugs suggest that they are inferior in the setting of high risk patients with APL. This patient has fairly high titers which would be considered significant risk.\"  Birth Control Plan if applicable: tubal ligation- - hx of stroke-like sx that began with her pregnancies (facial numbness)    Diet: green beans, broccoli, asparagus (2-3x weekly)  Alcohol: socially   Tobacco: cigarettes 4-5 daily + vape but trying to cut back. Aware to keep clinic informed  OTC Pain Medication:    INR History:  Date 4/20 4/29 5/4       Total Weekly Dose 15mg 40 mg 40mg       INR 1.75 2.44 3.31       Notes bactrim; lovenox D/c bactrim decr pred        *Take dose in the morning, 4/29 now in evenings*    Phone Interview:  Tablet Strength: 2.5 mg Jantoven  Patient Contact Info: [phone]  Estimated OOP cost: none  Verbal Release Auth: NEED   Lab Contact Info: [facility]    Patient Findings  Negatives:  Signs/symptoms of thrombosis, Signs/symptoms of bleeding, Laboratory test error suspected, Change in health, Change in alcohol use, Change in activity, Upcoming invasive procedure, Emergency department visit, Upcoming dental procedure, Missed doses, Extra doses, Change in medications, Change in diet/appetite, Hospital admission, Bruising, Other complaints         Plan:  1. INR is therapeutic at 3.31 (INR goal 2.5-3.5).  Instructed to continue warfarin  5 mg daily except 7.5 mg on SunThurs until recheck   2. Repeat INR Tuesday, 5/11,  at [facility]  3. Pt declines warfarin refills.  4. Verbal information " provided over the phone to Liliya Hook. Liliya Hook expresses understanding by teach back, RBV dosing instructions, and has no further questions at this time.     Ambrose Barrientos, PharmD  5/4/2022  10:12 EDT

## 2022-05-16 ENCOUNTER — TELEPHONE (OUTPATIENT)
Dept: PHARMACY | Facility: HOSPITAL | Age: 27
End: 2022-05-16

## 2022-05-16 RX ORDER — WARFARIN SODIUM 2.5 MG/1
TABLET ORAL
Qty: 60 TABLET | Refills: 0 | Status: SHIPPED | OUTPATIENT
Start: 2022-05-16 | End: 2022-06-20 | Stop reason: SDUPTHER

## 2022-05-16 NOTE — TELEPHONE ENCOUNTER
Spoke with patient re: overdue INR. Will check tomorrow or today. Refill sent per patient request, took her last tablets today

## 2022-06-17 ENCOUNTER — LAB (OUTPATIENT)
Dept: LAB | Facility: HOSPITAL | Age: 27
End: 2022-06-17

## 2022-06-17 DIAGNOSIS — I26.99 PULMONARY EMBOLISM, UNSPECIFIED CHRONICITY, UNSPECIFIED PULMONARY EMBOLISM TYPE, UNSPECIFIED WHETHER ACUTE COR PULMONALE PRESENT: ICD-10-CM

## 2022-06-17 LAB
INR PPP: 2.86 (ref 0.84–1.13)
PROTHROMBIN TIME: 30.2 SECONDS (ref 11.4–14.4)

## 2022-06-17 PROCEDURE — 85610 PROTHROMBIN TIME: CPT

## 2022-06-17 PROCEDURE — 36415 COLL VENOUS BLD VENIPUNCTURE: CPT

## 2022-06-20 ENCOUNTER — ANTICOAGULATION VISIT (OUTPATIENT)
Dept: PHARMACY | Facility: HOSPITAL | Age: 27
End: 2022-06-20

## 2022-06-20 DIAGNOSIS — I26.99 PULMONARY EMBOLISM, UNSPECIFIED CHRONICITY, UNSPECIFIED PULMONARY EMBOLISM TYPE, UNSPECIFIED WHETHER ACUTE COR PULMONALE PRESENT: Primary | ICD-10-CM

## 2022-06-20 RX ORDER — WARFARIN SODIUM 2.5 MG/1
TABLET ORAL
Qty: 60 TABLET | Refills: 0 | Status: SHIPPED | OUTPATIENT
Start: 2022-06-20 | End: 2022-07-08

## 2022-06-20 NOTE — PROGRESS NOTES
"Anticoagulation Clinic - Remote Progress Note  REMOTE LAB    Indication: PE in setting of antiphospholipid syndrome, 1/2022, 10/2021  Referring Provider:  Dr. Bindu Hayden MD  Initial Warfarin Start Date: 4/14/22  Goal INR: 2.5-3.5  Current Drug Interactions: prednisone, pantoprazole  Other: previously on Eliquis; Lupus    4/13 Hem/Onc - recommended switching to warfarin due to \"data with anti Xa drugs suggest that they are inferior in the setting of high risk patients with APL. This patient has fairly high titers which would be considered significant risk.\"  Birth Control Plan if applicable: tubal ligation- - hx of stroke-like sx that began with her pregnancies (facial numbness)    Diet: green beans, broccoli, asparagus (2-3x weekly)  Alcohol: socially   Tobacco: cigarettes 4-5 daily + vape but trying to cut back. Aware to keep clinic informed  OTC Pain Medication:    INR History:  Date 4/20 4/29 5/4 6/17      Total Weekly Dose 15mg 40 mg 40mg 35mg      INR 1.75 2.44 3.31 2.86      Notes bactrim; lovenox D/c bactrim decr pred  rec'd 6/20      *Take dose in the morning, 4/29 now in evenings*    Phone Interview:  Tablet Strength: 2.5 mg Roberttoven  Patient Contact Info: [phone]  Estimated OOP cost: none  Verbal Release Auth: NEED   Lab Contact Info: [facility]    Patient Findings  Negatives:  Signs/symptoms of thrombosis, Signs/symptoms of bleeding, Laboratory test error suspected, Change in health, Change in alcohol use, Change in activity, Upcoming invasive procedure, Emergency department visit, Upcoming dental procedure, Missed doses, Extra doses, Change in medications, Change in diet/appetite, Hospital admission, Bruising, Other complaints   Comments:  Patient reports no changes, however patient does report taking 5 mg daily instead of the last instructed dose of warfarin 5 mg daily except 7.5 mg SunThurs.         Plan:  1. INR is therapeutic at 2.86 (INR goal 2.5-3.5). Patient reported " consistently taking warfarin 5 mg daily so I instructed her to continue warfarin 5 mg daily until recheck.  2. Of note, patient was noncompliant with previous INR check (supposed to be on 5/11).   3. Repeat INR in 4 weeks Thursday 7/14  4. Reinforced the importance of compliance with INR check and follow up with clinic.   5. Patient requested warfarin refill.   6. Verbal information provided over the phone to Liliya Hook. Liliya Hook expresses understanding by teach back, RBV dosing instructions, and has no further questions at this time.     Thank you,    Peggy Bro, PharmD  6/20/2022  09:05 EDT

## 2022-06-27 ENCOUNTER — TELEPHONE (OUTPATIENT)
Dept: INTERNAL MEDICINE | Facility: CLINIC | Age: 27
End: 2022-06-27

## 2022-06-27 NOTE — TELEPHONE ENCOUNTER
Pt called and stated that her ear was hurting, it is excruciating pain and she can not sleep. I checked the schedules and there wasn't any available appointments today for any provider that I could see. I offered her an appointment with Peyman Cavazos and she stated that she could not wait another day. Pt would like antibiotics called in to her pharmacy. Pt would like a nurse to call her back. I told pt that she could go to urgent care and she just wanted a nurse to call her back.  Pt phone number is [phone]

## 2022-07-08 DIAGNOSIS — I26.99 PULMONARY EMBOLISM, UNSPECIFIED CHRONICITY, UNSPECIFIED PULMONARY EMBOLISM TYPE, UNSPECIFIED WHETHER ACUTE COR PULMONALE PRESENT: Primary | ICD-10-CM

## 2022-07-08 RX ORDER — WARFARIN SODIUM 2.5 MG/1
TABLET ORAL
Qty: 60 TABLET | Refills: 0 | Status: SHIPPED | OUTPATIENT
Start: 2022-07-08 | End: 2022-09-07

## 2022-08-08 ENCOUNTER — TELEPHONE (OUTPATIENT)
Dept: PHARMACY | Facility: HOSPITAL | Age: 27
End: 2022-08-08

## 2022-08-30 DIAGNOSIS — I26.99 PULMONARY EMBOLISM, UNSPECIFIED CHRONICITY, UNSPECIFIED PULMONARY EMBOLISM TYPE, UNSPECIFIED WHETHER ACUTE COR PULMONALE PRESENT: ICD-10-CM

## 2022-08-31 RX ORDER — WARFARIN SODIUM 2.5 MG/1
TABLET ORAL
Qty: 60 TABLET | Refills: 0 | OUTPATIENT
Start: 2022-08-31

## 2022-09-03 DIAGNOSIS — I26.99 PULMONARY EMBOLISM, UNSPECIFIED CHRONICITY, UNSPECIFIED PULMONARY EMBOLISM TYPE, UNSPECIFIED WHETHER ACUTE COR PULMONALE PRESENT: ICD-10-CM

## 2022-09-07 RX ORDER — WARFARIN SODIUM 2.5 MG/1
TABLET ORAL
Qty: 60 TABLET | Refills: 0 | Status: SHIPPED | OUTPATIENT
Start: 2022-09-07 | End: 2022-11-15 | Stop reason: SDUPTHER

## 2022-09-07 NOTE — TELEPHONE ENCOUNTER
Pt seen on 4/13/2022 and 3 mo f/u on 7/13/2022 was cx.    Spoke with pt and informed her that we can send in a 1 month supply to last her until she is able to be seen in the office.  Pt v/u and appreciation and will call the office to schedule.

## 2022-09-22 ENCOUNTER — TELEPHONE (OUTPATIENT)
Dept: PHARMACY | Facility: HOSPITAL | Age: 27
End: 2022-09-22

## 2022-09-23 ENCOUNTER — ANTICOAGULATION VISIT (OUTPATIENT)
Dept: PHARMACY | Facility: HOSPITAL | Age: 27
End: 2022-09-23

## 2022-09-23 DIAGNOSIS — I26.99 PULMONARY EMBOLISM, UNSPECIFIED CHRONICITY, UNSPECIFIED PULMONARY EMBOLISM TYPE, UNSPECIFIED WHETHER ACUTE COR PULMONALE PRESENT: Primary | ICD-10-CM

## 2022-09-23 NOTE — TELEPHONE ENCOUNTER
Waiting on lab results for renal function form  to dose Lovenox.  No recent labs with in [facility] files and worried about worsening renal function with recent renal biopsy.

## 2022-09-23 NOTE — TELEPHONE ENCOUNTER
Received labs from 6/29/22 from patient using her My Chart  Scr: 0.70 mg/dL  Weight: 97 kg    Instructed Mrs Hook to take Lovenox 90mg q12h 9/23-9/25, since she already had a box of Lovenox 90mg in possession.  She will also boost to warfarin 10mg today and tomorrow, then return to 5mg daily with recheck INR on 9/26.  Went over importance of notifying Anticoagulation Clinic and Dr South of upcoming procedures so we can help coordinate care with warfarin holds since she is at an increased risk of clotting with her APS indication.    Lupe Baptiste, PharmD, BCPS   9/23/2022  09:39 EDT

## 2022-10-13 ENCOUNTER — LAB (OUTPATIENT)
Dept: LAB | Facility: HOSPITAL | Age: 27
End: 2022-10-13

## 2022-10-13 DIAGNOSIS — I26.99 PULMONARY EMBOLISM, UNSPECIFIED CHRONICITY, UNSPECIFIED PULMONARY EMBOLISM TYPE, UNSPECIFIED WHETHER ACUTE COR PULMONALE PRESENT: ICD-10-CM

## 2022-10-13 LAB
INR PPP: 1.27 (ref 0.84–1.13)
PROTHROMBIN TIME: 15.8 SECONDS (ref 11.4–14.4)

## 2022-10-13 PROCEDURE — 85610 PROTHROMBIN TIME: CPT

## 2022-10-13 PROCEDURE — 36415 COLL VENOUS BLD VENIPUNCTURE: CPT

## 2022-10-14 ENCOUNTER — ANTICOAGULATION VISIT (OUTPATIENT)
Dept: PHARMACY | Facility: HOSPITAL | Age: 27
End: 2022-10-14

## 2022-10-14 DIAGNOSIS — I26.99 PULMONARY EMBOLISM, UNSPECIFIED CHRONICITY, UNSPECIFIED PULMONARY EMBOLISM TYPE, UNSPECIFIED WHETHER ACUTE COR PULMONALE PRESENT: Primary | ICD-10-CM

## 2022-10-14 NOTE — PROGRESS NOTES
"Anticoagulation Clinic - Remote Progress Note  REMOTE LAB    Indication: PE in setting of antiphospholipid syndrome, 1/2022, 10/2021  Referring Provider:  Dr. Bindu Hayden MD  Initial Warfarin Start Date: 4/14/22  Goal INR: 2.5-3.5  Current Drug Interactions: prednisone, pantoprazole  Other: previously on Eliquis; Lupus    4/13 Hem/Onc - recommended switching to warfarin due to \"data with anti Xa drugs suggest that they are inferior in the setting of high risk patients with APL. This patient has fairly high titers which would be considered significant risk.\"  Birth Control Plan if applicable: tubal ligation- - hx of stroke-like sx that began with her pregnancies (facial numbness)    Diet: green beans, broccoli, asparagus (2-3x weekly)  Alcohol: socially   Tobacco: cigarettes 4-5 daily + vape but trying to cut back. Aware to keep clinic informed  OTC Pain Medication:    INR History:  Date 4/20 4/29 5/4 6/17 Non-compliance 10/14    Total Weekly Dose 15mg 40 mg 40mg 35mg  42.5 mg    INR 1.75 2.44 3.31 2.86  1.27    Notes bactrim; lovenox D/c bactrim decr pred  rec'd 6/20      *Take dose in the morning, 4/29 now in evenings*    Phone Interview:  Tablet Strength: 2.5 mg Inderjit  Patient Contact Info: [phone]  Estimated OOP cost: none  Verbal Release Auth: NEED   Lab Contact Info: [facility]    Patient Findings  Received labs from 6/29/22  Scr: 0.70 mg/dL  Weight: 97 kg     Negatives:  Signs/symptoms of thrombosis, Signs/symptoms of bleeding, Laboratory test error suspected, Change in health, Change in alcohol use, Change in activity, Upcoming invasive procedure, Emergency department visit, Upcoming dental procedure, Missed doses, Extra doses, Change in medications, Change in diet/appetite, Hospital admission, Bruising, Other complaints   Comments:  States she has been taking 7.5mg alternating with 5mg since last spoke to [facility] clinic, denies missed doses.  She did take shots for 3 days like " requested but was unable to get to lab until today.  She states [facility] the closet lab for her to get to but it is still difficult to visit this lab because she only comes to [place] for work.  She asks about a home monitor  to help increase compliance but unfortunately these are less accurate in patients that have APS.       Plan:  1. INR is SUBtherapeutic at 1.27 (INR goal 2.5-3.5). Patient reported consistently taking warfarin 5 mg daily alternating with 7.5mg. Instructed her start Lovenox 90mg q12h today, Saturday and Sunday, and to boost warfarin dose to 10mg today, 10mg Saturday, 7.5mg Sunday and 5mg Monday until recheck.  2. Of note, patient was noncompliant with previous INR check (supposed to be on 9/26 following renal biopsy and holding warfarin; [facility] clinic was not aware of this procedure until afterwards).   3. Repeat INR Tuesday 10/18  4. Reinforced the importance of compliance with INR check and follow up with clinic.   5. Patient requested warfarin refill.   6. Verbal information provided over the phone to Liliya Hook. Liliya Hook expresses understanding by teach back, RBV dosing instructions, and has no further questions at this time.     Thank you,    Lupe Baptiste, PharmD, BCPS   10/14/2022  12:07 EDT

## 2022-10-26 DIAGNOSIS — I26.99 PULMONARY EMBOLISM, UNSPECIFIED CHRONICITY, UNSPECIFIED PULMONARY EMBOLISM TYPE, UNSPECIFIED WHETHER ACUTE COR PULMONALE PRESENT: ICD-10-CM

## 2022-10-27 RX ORDER — WARFARIN SODIUM 2.5 MG/1
TABLET ORAL
Qty: 60 TABLET | Refills: 0 | OUTPATIENT
Start: 2022-10-27

## 2022-11-15 DIAGNOSIS — I26.99 PULMONARY EMBOLISM, UNSPECIFIED CHRONICITY, UNSPECIFIED PULMONARY EMBOLISM TYPE, UNSPECIFIED WHETHER ACUTE COR PULMONALE PRESENT: ICD-10-CM

## 2022-11-15 RX ORDER — WARFARIN SODIUM 2.5 MG/1
TABLET ORAL
Qty: 75 TABLET | Refills: 0 | Status: SHIPPED | OUTPATIENT
Start: 2022-11-15

## 2022-11-15 NOTE — TELEPHONE ENCOUNTER
"Ms Hook has moved to [place] to be with family. She is in the process of finding another AC clinic to follow. She has not had any warfarin \"in at least a week.\" Lab draw cost prohibitive d/t state insurance change in process.  "

## 2024-10-29 NOTE — TELEPHONE ENCOUNTER
Dr. South,    Called patient today in regards to overdue INR, last reported in June of this year.  During our telephone call Liliya states she recently had a renal biopsy on 9/15 and was told to hold her warfarin for 5 days before biopsy and 5 days after, prior to this phone call the clinic was not aware of planned procedure. She restarted warfarin yesterday and I have requested she recheck her INR on Monday 9/26, but patient has a history of non-compliance.  Due to her indication of PE in setting APS would you recommend bridging her at this time?   Left message for patient to call to schedule appt.